# Patient Record
Sex: FEMALE | URBAN - METROPOLITAN AREA
[De-identification: names, ages, dates, MRNs, and addresses within clinical notes are randomized per-mention and may not be internally consistent; named-entity substitution may affect disease eponyms.]

---

## 2020-12-30 ENCOUNTER — INPATIENT (INPATIENT)
Facility: HOSPITAL | Age: 60
LOS: 6 days | Discharge: HOME HEALTH | DRG: 177 | End: 2021-01-06
Attending: INTERNAL MEDICINE | Admitting: INTERNAL MEDICINE
Payer: COMMERCIAL

## 2020-12-30 VITALS
WEIGHT: 186.07 LBS | SYSTOLIC BLOOD PRESSURE: 110 MMHG | OXYGEN SATURATION: 89 % | HEIGHT: 64 IN | HEART RATE: 82 BPM | TEMPERATURE: 98 F | RESPIRATION RATE: 22 BRPM | DIASTOLIC BLOOD PRESSURE: 72 MMHG

## 2020-12-30 DIAGNOSIS — J96.01 ACUTE RESPIRATORY FAILURE WITH HYPOXIA: ICD-10-CM

## 2020-12-30 LAB
ALBUMIN SERPL ELPH-MCNC: 3.9 G/DL — SIGNIFICANT CHANGE UP (ref 3.3–5)
ALP SERPL-CCNC: 64 U/L — SIGNIFICANT CHANGE UP (ref 40–120)
ALT FLD-CCNC: 27 U/L — SIGNIFICANT CHANGE UP (ref 10–45)
ANION GAP SERPL CALC-SCNC: 15 MMOL/L — SIGNIFICANT CHANGE UP (ref 5–17)
AST SERPL-CCNC: 64 U/L — HIGH (ref 10–40)
BASOPHILS # BLD AUTO: 0 K/UL — SIGNIFICANT CHANGE UP (ref 0–0.2)
BASOPHILS NFR BLD AUTO: 0 % — SIGNIFICANT CHANGE UP (ref 0–2)
BILIRUB SERPL-MCNC: 0.6 MG/DL — SIGNIFICANT CHANGE UP (ref 0.2–1.2)
BUN SERPL-MCNC: 19 MG/DL — SIGNIFICANT CHANGE UP (ref 7–23)
CALCIUM SERPL-MCNC: 8.9 MG/DL — SIGNIFICANT CHANGE UP (ref 8.4–10.5)
CHLORIDE SERPL-SCNC: 101 MMOL/L — SIGNIFICANT CHANGE UP (ref 96–108)
CO2 SERPL-SCNC: 20 MMOL/L — LOW (ref 22–31)
CREAT SERPL-MCNC: 1.09 MG/DL — SIGNIFICANT CHANGE UP (ref 0.5–1.3)
CRP SERPL-MCNC: 3.59 MG/DL — HIGH (ref 0–0.4)
D DIMER BLD IA.RAPID-MCNC: 165 NG/ML DDU — SIGNIFICANT CHANGE UP
EOSINOPHIL # BLD AUTO: 0 K/UL — SIGNIFICANT CHANGE UP (ref 0–0.5)
EOSINOPHIL NFR BLD AUTO: 0 % — SIGNIFICANT CHANGE UP (ref 0–6)
FERRITIN SERPL-MCNC: 598 NG/ML — HIGH (ref 15–150)
GLUCOSE SERPL-MCNC: 110 MG/DL — HIGH (ref 70–99)
HCT VFR BLD CALC: 42.3 % — SIGNIFICANT CHANGE UP (ref 34.5–45)
HGB BLD-MCNC: 14.2 G/DL — SIGNIFICANT CHANGE UP (ref 11.5–15.5)
LYMPHOCYTES # BLD AUTO: 0.42 K/UL — LOW (ref 1–3.3)
LYMPHOCYTES # BLD AUTO: 13.8 % — SIGNIFICANT CHANGE UP (ref 13–44)
MCHC RBC-ENTMCNC: 29.7 PG — SIGNIFICANT CHANGE UP (ref 27–34)
MCHC RBC-ENTMCNC: 33.6 GM/DL — SIGNIFICANT CHANGE UP (ref 32–36)
MCV RBC AUTO: 88.5 FL — SIGNIFICANT CHANGE UP (ref 80–100)
MONOCYTES # BLD AUTO: 0.36 K/UL — SIGNIFICANT CHANGE UP (ref 0–0.9)
MONOCYTES NFR BLD AUTO: 12.1 % — SIGNIFICANT CHANGE UP (ref 2–14)
NEUTROPHILS # BLD AUTO: 2.2 K/UL — SIGNIFICANT CHANGE UP (ref 1.8–7.4)
NEUTROPHILS NFR BLD AUTO: 64.6 % — SIGNIFICANT CHANGE UP (ref 43–77)
PLATELET # BLD AUTO: 185 K/UL — SIGNIFICANT CHANGE UP (ref 150–400)
POTASSIUM SERPL-MCNC: 4.5 MMOL/L — SIGNIFICANT CHANGE UP (ref 3.5–5.3)
POTASSIUM SERPL-SCNC: 4.5 MMOL/L — SIGNIFICANT CHANGE UP (ref 3.5–5.3)
PROCALCITONIN SERPL-MCNC: 0.17 NG/ML — HIGH (ref 0.02–0.1)
PROT SERPL-MCNC: 8.2 G/DL — SIGNIFICANT CHANGE UP (ref 6–8.3)
RBC # BLD: 4.78 M/UL — SIGNIFICANT CHANGE UP (ref 3.8–5.2)
RBC # FLD: 13.7 % — SIGNIFICANT CHANGE UP (ref 10.3–14.5)
SARS-COV-2 RNA SPEC QL NAA+PROBE: DETECTED
SODIUM SERPL-SCNC: 136 MMOL/L — SIGNIFICANT CHANGE UP (ref 135–145)
WBC # BLD: 3.01 K/UL — LOW (ref 3.8–10.5)
WBC # FLD AUTO: 3.01 K/UL — LOW (ref 3.8–10.5)

## 2020-12-30 PROCEDURE — 71045 X-RAY EXAM CHEST 1 VIEW: CPT | Mod: 26

## 2020-12-30 PROCEDURE — 99285 EMERGENCY DEPT VISIT HI MDM: CPT

## 2020-12-30 PROCEDURE — 99255 IP/OBS CONSLTJ NEW/EST HI 80: CPT

## 2020-12-30 PROCEDURE — 93010 ELECTROCARDIOGRAM REPORT: CPT | Mod: NC

## 2020-12-30 RX ORDER — REMDESIVIR 5 MG/ML
100 INJECTION INTRAVENOUS EVERY 24 HOURS
Refills: 0 | Status: COMPLETED | OUTPATIENT
Start: 2020-12-31 | End: 2021-01-03

## 2020-12-30 RX ORDER — PANTOPRAZOLE SODIUM 20 MG/1
40 TABLET, DELAYED RELEASE ORAL
Refills: 0 | Status: DISCONTINUED | OUTPATIENT
Start: 2020-12-30 | End: 2021-01-06

## 2020-12-30 RX ORDER — DEXAMETHASONE 0.5 MG/5ML
6 ELIXIR ORAL DAILY
Refills: 0 | Status: DISCONTINUED | OUTPATIENT
Start: 2020-12-30 | End: 2021-01-06

## 2020-12-30 RX ORDER — ACETAMINOPHEN 500 MG
650 TABLET ORAL EVERY 6 HOURS
Refills: 0 | Status: DISCONTINUED | OUTPATIENT
Start: 2020-12-30 | End: 2021-01-06

## 2020-12-30 RX ORDER — BUDESONIDE AND FORMOTEROL FUMARATE DIHYDRATE 160; 4.5 UG/1; UG/1
2 AEROSOL RESPIRATORY (INHALATION)
Refills: 0 | Status: DISCONTINUED | OUTPATIENT
Start: 2020-12-30 | End: 2020-12-31

## 2020-12-30 RX ORDER — ENOXAPARIN SODIUM 100 MG/ML
40 INJECTION SUBCUTANEOUS EVERY 12 HOURS
Refills: 0 | Status: DISCONTINUED | OUTPATIENT
Start: 2020-12-30 | End: 2021-01-06

## 2020-12-30 RX ORDER — ACETAMINOPHEN 500 MG
975 TABLET ORAL ONCE
Refills: 0 | Status: COMPLETED | OUTPATIENT
Start: 2020-12-30 | End: 2020-12-30

## 2020-12-30 RX ORDER — REMDESIVIR 5 MG/ML
200 INJECTION INTRAVENOUS EVERY 24 HOURS
Refills: 0 | Status: COMPLETED | OUTPATIENT
Start: 2020-12-30 | End: 2020-12-30

## 2020-12-30 RX ORDER — REMDESIVIR 5 MG/ML
INJECTION INTRAVENOUS
Refills: 0 | Status: COMPLETED | OUTPATIENT
Start: 2020-12-30 | End: 2021-01-03

## 2020-12-30 RX ORDER — ALBUTEROL 90 UG/1
2 AEROSOL, METERED ORAL ONCE
Refills: 0 | Status: COMPLETED | OUTPATIENT
Start: 2020-12-30 | End: 2020-12-30

## 2020-12-30 RX ORDER — INFLUENZA VIRUS VACCINE 15; 15; 15; 15 UG/.5ML; UG/.5ML; UG/.5ML; UG/.5ML
0.5 SUSPENSION INTRAMUSCULAR ONCE
Refills: 0 | Status: DISCONTINUED | OUTPATIENT
Start: 2020-12-30 | End: 2021-01-06

## 2020-12-30 RX ORDER — ALBUTEROL 90 UG/1
2 AEROSOL, METERED ORAL EVERY 6 HOURS
Refills: 0 | Status: DISCONTINUED | OUTPATIENT
Start: 2020-12-30 | End: 2020-12-31

## 2020-12-30 RX ORDER — DEXAMETHASONE 0.5 MG/5ML
6 ELIXIR ORAL ONCE
Refills: 0 | Status: COMPLETED | OUTPATIENT
Start: 2020-12-30 | End: 2020-12-30

## 2020-12-30 RX ORDER — ONDANSETRON 8 MG/1
4 TABLET, FILM COATED ORAL ONCE
Refills: 0 | Status: COMPLETED | OUTPATIENT
Start: 2020-12-30 | End: 2020-12-30

## 2020-12-30 RX ORDER — SODIUM CHLORIDE 9 MG/ML
1000 INJECTION, SOLUTION INTRAVENOUS ONCE
Refills: 0 | Status: COMPLETED | OUTPATIENT
Start: 2020-12-30 | End: 2020-12-30

## 2020-12-30 RX ADMIN — ALBUTEROL 2 PUFF(S): 90 AEROSOL, METERED ORAL at 12:20

## 2020-12-30 RX ADMIN — Medication 6 MILLIGRAM(S): at 21:17

## 2020-12-30 RX ADMIN — REMDESIVIR 500 MILLIGRAM(S): 5 INJECTION INTRAVENOUS at 18:25

## 2020-12-30 RX ADMIN — Medication 100 MILLIGRAM(S): at 21:16

## 2020-12-30 RX ADMIN — Medication 650 MILLIGRAM(S): at 21:25

## 2020-12-30 RX ADMIN — Medication 650 MILLIGRAM(S): at 22:25

## 2020-12-30 RX ADMIN — ENOXAPARIN SODIUM 40 MILLIGRAM(S): 100 INJECTION SUBCUTANEOUS at 18:26

## 2020-12-30 RX ADMIN — Medication 975 MILLIGRAM(S): at 12:14

## 2020-12-30 RX ADMIN — ONDANSETRON 4 MILLIGRAM(S): 8 TABLET, FILM COATED ORAL at 12:01

## 2020-12-30 RX ADMIN — Medication 6 MILLIGRAM(S): at 12:13

## 2020-12-30 RX ADMIN — Medication 975 MILLIGRAM(S): at 12:05

## 2020-12-30 RX ADMIN — SODIUM CHLORIDE 500 MILLILITER(S): 9 INJECTION, SOLUTION INTRAVENOUS at 12:02

## 2020-12-30 NOTE — ED PROVIDER NOTE - CLINICAL SUMMARY MEDICAL DECISION MAKING FREE TEXT BOX
Attending MD Khan: 60F with asthma and COPD (no home O2) presenting with COVID dx and hypoxic resp failure (O2 sat 85% in RA). Likely severe COVID-19 requiring admission to hospital for IV steroids and remdesivir therapy.       *The above represents an initial assessment/impression. Please refer to progress notes for potential changes in patient clinical course*

## 2020-12-30 NOTE — H&P ADULT - HISTORY OF PRESENT ILLNESS
60F with COPD and asthma presents with COVID dx, cough, headache, nausea, and body aches x 3-4 days.  Pt reports worsening SOB. Does not use home O2 for COPD. Also reports diffuse lower abdominal pain. Denies fevers, CP, vomiting, diarrhea.

## 2020-12-30 NOTE — ED ADULT TRIAGE NOTE - CHIEF COMPLAINT QUOTE
COVID positive diagnosed 3 days ago, weakness, shortness of breath, fevers, cough x 6 days. Hypoxic on room air

## 2020-12-30 NOTE — ED PROVIDER NOTE - ATTENDING CONTRIBUTION TO CARE
Attending MD Khan:   I personally have seen and examined this patient.  Physician assistant note reviewed and agree on plan of care and except where noted.  See HPI, PE, and MDM for details.

## 2020-12-30 NOTE — ED PROVIDER NOTE - OBJECTIVE STATEMENT
60F with COPD and asthma presents with COVID dx, cough, headache nausea x 9 -10 days      Antonio Noel 60F with COPD and asthma presents with COVID dx, cough, headache, nausea, and body aches x 9 -10 days.  Pt reports worsening SOB. Does not use home O2 for COPD. Also reports diffuse lower abdominal pain. Denies fevers, CP, vomiting, diarrhea.   PCP Antonio Noel

## 2020-12-30 NOTE — H&P ADULT - ASSESSMENT
60 f with    Probable COVID 19 Pneumonia  - remdesivir  - steroids  - Proventil inhaler  - follow inflammatory markers  - ID evaluation  - Pulmonary evaluation  - AC    COPD  - nebs  - Pulmonary evaluation    Nausea control    Further action as per clinical course     Andres Ariza MD pager [phone]

## 2020-12-30 NOTE — H&P ADULT - NSHPREVIEWOFSYSTEMS_GEN_ALL_CORE
REVIEW OF SYSTEMS:    CONSTITUTIONAL: + weakness,+ fevers + chills  EYES/ENT: No visual changes;  No vertigo or throat pain   NECK: No pain or stiffness  RESPIRATORY: + cough, no wheezing, no hemoptysis; + shortness of breath  CARDIOVASCULAR: No chest pain or palpitations  GASTROINTESTINAL: No abdominal or epigastric pain. No nausea, vomiting, or hematemesis; + diarrhea . No melena or hematochezia.  GENITOURINARY: No dysuria, frequency or hematuria  NEUROLOGICAL: No numbness or weakness  SKIN: No itching, burning, rashes, or lesions   All other review of systems is negative unless indicated above.

## 2020-12-30 NOTE — ED ADULT NURSE NOTE - OBJECTIVE STATEMENT
60 year old female pt presented to the ED co sob/fatigue, pt diagnosed with covid-19 x 3 days ago states increasingly SOB, pt with a H/O COPD not O2 dependant, pt not in respiratory distress , o2 sat to mid 80s%, pt with 6L NC O2 sat 97%, pt states dec PO, fatigue, abd soft non tender non distended lung field cta, pt states feeling better with O2,

## 2020-12-30 NOTE — CONSULT NOTE ADULT - SUBJECTIVE AND OBJECTIVE BOX
"HPI:  60F with COPD and asthma presents with COVID dx, cough, headache, nausea, and body aches x 3-4 days.  Pt reports worsening SOB. Does not use home O2 for COPD. Also reports diffuse lower abdominal pain. Denies fevers, CP, vomiting, diarrhea.  (30 Dec 2020 15:27)"    Above reviewed. Saw/spoke to patient. Patient states starting ~1 week before Windy developed mild symptoms of fatigue, headache, body aches, abd pain. These symptoms progressed into [place] to the point where she was so fatigued she couldn't get out of bed. Had a rapid test that was positive for COVID so was sent to the hospital for further care. Today, patient on NC O2, feels SOB worse on ambulation. Feels nausea. Generally feels unwell but is stable. ID called for further eval.    PAST MEDICAL & SURGICAL HISTORY:  COPD (chronic obstructive pulmonary disease)    Allergies    penicillin (Rash)    ANTIMICROBIALS:  remdesivir  IVPB      OTHER MEDS:  acetaminophen   Tablet .. 650 milliGRAM(s) Oral every 6 hours PRN  ALBUTerol    90 MICROgram(s) HFA Inhaler 2 Puff(s) Inhalation every 6 hours PRN  budesonide  80 MICROgram(s)/formoterol 4.5 MICROgram(s) Inhaler 2 Puff(s) Inhalation two times a day  dexAMETHasone  Injectable 6 milliGRAM(s) IV Push daily  enoxaparin Injectable 40 milliGRAM(s) SubCutaneous every 12 hours  pantoprazole    Tablet 40 milliGRAM(s) Oral before breakfast    SOCIAL HISTORY: No tobacco, no alcohol, no illicit drugs    FAMILY HISTORY: No pertinent family history relating to chief complaint    Drug Dosing Weight  Height (cm): 162.6 (30 Dec 2020 10:55)  Weight (kg): 84.4 (30 Dec 2020 10:55)  BMI (kg/m2): 31.9 (30 Dec 2020 10:55)  BSA (m2): 1.9 (30 Dec 2020 10:55)    PE:    Vital Signs Last 24 Hrs  T(C): 36.7 (30 Dec 2020 15:53), Max: 36.8 (30 Dec 2020 14:05)  T(F): 98.1 (30 Dec 2020 15:53), Max: 98.3 (30 Dec 2020 14:05)  HR: 66 (30 Dec 2020 15:53) (66 - 82)  BP: 106/69 (30 Dec 2020 15:53) (97/65 - 110/72)  RR: 22 (30 Dec 2020 15:53) (22 - 22)  SpO2: 96% (30 Dec 2020 15:53) (89% - 100%)    Gen: AOx3, NAD, non-toxic  CV: S1+S2 normal, nontachycardic  Resp: Clear bilat, no resp distress, no crackles/wheezes, nasal canula 6L  Abd: Soft, nontender, +BS  Ext: No LE edema, no wounds  : No Javier  IV/Skin: No thrombophlebitis  Msk: No low back pain, no arthralgias, no joint swelling  Neuro: No sensory deficits, no motor deficits    LABS:                        14.2   3.01  )-----------( 185      ( 30 Dec 2020 12:10 )             42.3     12-30    136  |  101  |  19  ----------------------------<  110<H>  4.5   |  20<L>  |  1.09    Ca    8.9      30 Dec 2020 12:10    TPro  8.2  /  Alb  3.9  /  TBili  0.6  /  DBili  x   /  AST  64<H>  /  ALT  27  /  AlkPhos  64  12-30    MICROBIOLOGY:    COVID pending (reports outpatient rapid test positive)    RADIOLOGY:    12/30 XR:    Impression:    Poor inspiratory effort. The heart is normal in size. Ill-defined opacities are seen in both lower lobe and pneumonia cannot be ruled out entirely. No pleural effusion. No pneumothorax. No acute bony pathology could be identified.

## 2020-12-30 NOTE — CONSULT NOTE ADULT - ASSESSMENT
60 F with COPD and asthma presents with COVID dx, cough, headache, nausea, and body aches x 3-4 days  Reported outpatient rapid COVID+  CXR with bilateral lower lobe ill defined opacities, I reviewed personally  Clinically appears as COVID vs alternate viral etiology  Overall,  1) COVID  - Remdisivir, 5 days then DC  - Dex 6mg po q 24 x 10 days (note that patient presently on 6L NC O2 supplementation)  - Supportive care, monitor for secondary infections, presently does not appear to have bacterial infection  2) Hypoxia  - Note baseline COPD, not normally on home O2  - O2 supplementation per primary team    Compa Alberto MD  Pager [phone]  After 5pm and on weekends call [phone]

## 2020-12-30 NOTE — H&P ADULT - NSHPLABSRESULTS_GEN_ALL_CORE
14.2   3.01  )-----------( 185      ( 30 Dec 2020 12:10 )             42.3       12-30    136  |  101  |  19  ----------------------------<  110<H>  4.5   |  20<L>  |  1.09    Ca    8.9      30 Dec 2020 12:10    TPro  8.2  /  Alb  3.9  /  TBili  0.6  /  DBili  x   /  AST  64<H>  /  ALT  27  /  AlkPhos  64  12-30          < from: Xray Chest 1 View- PORTABLE-Urgent (12.30.20 @ 12:17) >    Impression:    Poor inspiratory effort. The heart is normal in size. Ill-defined opacities are seen in both lower lobe and pneumonia cannot be ruled out entirely. No pleural effusion. No pneumothorax. No acute bony pathology could be identified.    < end of copied text >    EKG SR

## 2020-12-30 NOTE — H&P ADULT - NSHPPHYSICALEXAM_GEN_ALL_CORE
PHYSICAL EXAMINATION:  Vital Signs Last 24 Hrs  T(C): 36.8 (30 Dec 2020 14:05), Max: 36.8 (30 Dec 2020 14:05)  T(F): 98.3 (30 Dec 2020 14:05), Max: 98.3 (30 Dec 2020 14:05)  HR: 71 (30 Dec 2020 14:05) (70 - 82)  BP: 106/64 (30 Dec 2020 14:05) (97/65 - 110/72)  BP(mean): --  RR: 22 (30 Dec 2020 14:05) (22 - 22)  SpO2: 95% (30 Dec 2020 14:05) (89% - 100%)  CAPILLARY BLOOD GLUCOSE          GENERAL: NAD, well-groomed, well-developed  HEAD:  atraumatic, normocephalic  EYES: sclera anicteric  ENMT: mucous membranes moist  NECK: supple, No JVD  CHEST/LUNG: clear to auscultation bilaterally; no rales, rhonchi, or wheezing b/l  HEART: normal S1, S2  ABDOMEN: BS+, soft, ND, NT   EXTREMITIES:  pulses palpable; no clubbing, cyanosis, or edema b/l LEs  NEURO: awake, alert, interactive; moves all extremities  SKIN: no rashes or lesions

## 2020-12-31 LAB
ANION GAP SERPL CALC-SCNC: 12 MMOL/L — SIGNIFICANT CHANGE UP (ref 5–17)
BUN SERPL-MCNC: 18 MG/DL — SIGNIFICANT CHANGE UP (ref 7–23)
CALCIUM SERPL-MCNC: 9.1 MG/DL — SIGNIFICANT CHANGE UP (ref 8.4–10.5)
CHLORIDE SERPL-SCNC: 104 MMOL/L — SIGNIFICANT CHANGE UP (ref 96–108)
CO2 SERPL-SCNC: 21 MMOL/L — LOW (ref 22–31)
CREAT SERPL-MCNC: 0.79 MG/DL — SIGNIFICANT CHANGE UP (ref 0.5–1.3)
GLUCOSE SERPL-MCNC: 166 MG/DL — HIGH (ref 70–99)
HCT VFR BLD CALC: 40.9 % — SIGNIFICANT CHANGE UP (ref 34.5–45)
HCV AB S/CO SERPL IA: 0.14 S/CO — SIGNIFICANT CHANGE UP (ref 0–0.99)
HCV AB SERPL-IMP: SIGNIFICANT CHANGE UP
HGB BLD-MCNC: 13.5 G/DL — SIGNIFICANT CHANGE UP (ref 11.5–15.5)
MCHC RBC-ENTMCNC: 29.4 PG — SIGNIFICANT CHANGE UP (ref 27–34)
MCHC RBC-ENTMCNC: 33 GM/DL — SIGNIFICANT CHANGE UP (ref 32–36)
MCV RBC AUTO: 89.1 FL — SIGNIFICANT CHANGE UP (ref 80–100)
NRBC # BLD: 0 /100 WBCS — SIGNIFICANT CHANGE UP (ref 0–0)
PLATELET # BLD AUTO: 165 K/UL — SIGNIFICANT CHANGE UP (ref 150–400)
POTASSIUM SERPL-MCNC: 3.9 MMOL/L — SIGNIFICANT CHANGE UP (ref 3.5–5.3)
POTASSIUM SERPL-SCNC: 3.9 MMOL/L — SIGNIFICANT CHANGE UP (ref 3.5–5.3)
RBC # BLD: 4.59 M/UL — SIGNIFICANT CHANGE UP (ref 3.8–5.2)
RBC # FLD: 13.3 % — SIGNIFICANT CHANGE UP (ref 10.3–14.5)
SODIUM SERPL-SCNC: 137 MMOL/L — SIGNIFICANT CHANGE UP (ref 135–145)
WBC # BLD: 2.39 K/UL — LOW (ref 3.8–10.5)
WBC # FLD AUTO: 2.39 K/UL — LOW (ref 3.8–10.5)

## 2020-12-31 PROCEDURE — 99232 SBSQ HOSP IP/OBS MODERATE 35: CPT

## 2020-12-31 RX ORDER — GUAIFENESIN/DEXTROMETHORPHAN 600MG-30MG
10 TABLET, EXTENDED RELEASE 12 HR ORAL
Refills: 0 | Status: DISCONTINUED | OUTPATIENT
Start: 2020-12-31 | End: 2021-01-06

## 2020-12-31 RX ORDER — ONDANSETRON 8 MG/1
4 TABLET, FILM COATED ORAL ONCE
Refills: 0 | Status: COMPLETED | OUTPATIENT
Start: 2020-12-31 | End: 2020-12-31

## 2020-12-31 RX ORDER — BUDESONIDE AND FORMOTEROL FUMARATE DIHYDRATE 160; 4.5 UG/1; UG/1
2 AEROSOL RESPIRATORY (INHALATION)
Refills: 0 | Status: DISCONTINUED | OUTPATIENT
Start: 2020-12-31 | End: 2021-01-06

## 2020-12-31 RX ADMIN — Medication 6 MILLIGRAM(S): at 05:21

## 2020-12-31 RX ADMIN — PANTOPRAZOLE SODIUM 40 MILLIGRAM(S): 20 TABLET, DELAYED RELEASE ORAL at 05:21

## 2020-12-31 RX ADMIN — ONDANSETRON 4 MILLIGRAM(S): 8 TABLET, FILM COATED ORAL at 17:51

## 2020-12-31 RX ADMIN — Medication 100 MILLIGRAM(S): at 05:21

## 2020-12-31 RX ADMIN — REMDESIVIR 500 MILLIGRAM(S): 5 INJECTION INTRAVENOUS at 17:21

## 2020-12-31 RX ADMIN — Medication 100 MILLIGRAM(S): at 11:26

## 2020-12-31 RX ADMIN — BUDESONIDE AND FORMOTEROL FUMARATE DIHYDRATE 2 PUFF(S): 160; 4.5 AEROSOL RESPIRATORY (INHALATION) at 17:21

## 2020-12-31 RX ADMIN — ENOXAPARIN SODIUM 40 MILLIGRAM(S): 100 INJECTION SUBCUTANEOUS at 17:21

## 2020-12-31 RX ADMIN — Medication 200 MILLIGRAM(S): at 13:50

## 2020-12-31 RX ADMIN — BUDESONIDE AND FORMOTEROL FUMARATE DIHYDRATE 2 PUFF(S): 160; 4.5 AEROSOL RESPIRATORY (INHALATION) at 05:21

## 2020-12-31 RX ADMIN — ENOXAPARIN SODIUM 40 MILLIGRAM(S): 100 INJECTION SUBCUTANEOUS at 05:21

## 2020-12-31 RX ADMIN — Medication 200 MILLIGRAM(S): at 21:37

## 2020-12-31 NOTE — PROGRESS NOTE ADULT - ASSESSMENT
60 f with    Probable COVID 19 Pneumonia  - remdesivir 2/5  - steroids 2/10  - Proventil inhaler  - follow inflammatory markers  - ID follow  - Pulmonary follow   - AC    COPD  - nebs  - Pulmonary evaluation    Nausea control    Andres Ariza MD pager [phone]

## 2020-12-31 NOTE — CONSULT NOTE ADULT - SUBJECTIVE AND OBJECTIVE BOX
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 The patient has been feeling unwell since before Christmas with fatigue and malaise, myalgias, cough, headache and nausea with anorexia. She subsequently developed abdominal discomfort with nausea, vomiting and diarrhea. On Christmas, she was so tired she was unable to get out of bed. She tested positive for COVID-19 infection on 12/27. Since that time, she has developed a non productive cough associated with shortness of breath with exertion, chest congestion and wheeze. No fevers, chills or sweats. No chest pain/pressure or palpitations    PMHX:  COPD (chronic obstructive pulmonary disease)    Asthma    PSHX:  no significant past medical history      FAMILY HISTORY:  2 young children at home have COVID-19 infection      SOCIAL HISTORY:  remote smoker    Pulmonary Medications:   benzonatate 100 milliGRAM(s) Oral every 8 hours  budesonide  80 MICROgram(s)/formoterol 4.5 MICROgram(s) Inhaler 2 Puff(s) Inhalation two times a day      Antimicrobials:  remdesivir  IVPB 100 milliGRAM(s) IV Intermittent every 24 hours  remdesivir  IVPB   IV Intermittent       Cardiology:      Other:  dexAMETHasone  Injectable 6 milliGRAM(s) IV Push daily  enoxaparin Injectable 40 milliGRAM(s) SubCutaneous every 12 hours  influenza   Vaccine 0.5 milliLiter(s) IntraMuscular once  pantoprazole    Tablet 40 milliGRAM(s) Oral before breakfast      Prn:  MEDICATIONS  (PRN):  acetaminophen   Tablet .. 650 milliGRAM(s) Oral every 6 hours PRN Temp greater or equal to 38.5C (101.3F), Mild Pain (1 - 3)  ALBUTerol    90 MICROgram(s) HFA Inhaler 2 Puff(s) Inhalation every 6 hours PRN Shortness of Breath and/or Wheezing  guaiFENesin   Syrup  (Sugar-Free) 100 milliGRAM(s) Oral every 6 hours PRN Cough      Allergies    penicillin (Rash)    HOME MEDICATIONS: see  H & P    REVIEW OF SYSTEMS:  Constitutional: As per HPI  HEENT: Within normal limits  CV: As per HPI  Resp: As per HPI  GI: Within normal limits   : Within normal limits  Musculoskeletal: Within normal limits  Skin: Within normal limits  Neurological: Within normal limits  Psychiatric: Within normal limits  Endocrine: Within normal limits  Hematologic/Lymphatic: Within normal limits  Allergic/Immunologic: Within normal limits    [x] All other systems negative    OBJECTIVE:    I&O's Detail    30 Dec 2020 07:01  -  31 Dec 2020 07:00  --------------------------------------------------------  IN:    Oral Fluid: 520 mL  Total IN: 520 mL    OUT:  Total OUT: 0 mL    Total NET: 520 mL      31 Dec 2020 07:01  -  31 Dec 2020 12:42  --------------------------------------------------------  IN:    Oral Fluid: 480 mL  Total IN: 480 mL    OUT:  Total OUT: 0 mL    Total NET: 480 mL    PHYSICAL EXAM:  ICU Vital Signs Last 24 Hrs  T(C): 36.4 (31 Dec 2020 10:15), Max: 36.8 (30 Dec 2020 14:05)  T(F): 97.5 (31 Dec 2020 10:15), Max: 98.3 (30 Dec 2020 14:05)  HR: 66 (31 Dec 2020 10:15) (57 - 74)  BP: 115/79 (31 Dec 2020 10:15) (104/71 - 115/79)  BP(mean): --  ABP: --  ABP(mean): --  RR: 20 (31 Dec 2020 10:15) (18 - 22)  SpO2: 93% (31 Dec 2020 10:15) (93% - 96%) on 5lpm nasal canula    General: Awake. Alert. Cooperative. No distress. Appears stated age. Obese. 	  HEENT:   Atraumatic. Normocephalic. Anicteric. Normal oral mucosa. PERRL. EOMI.  Neck: Supple. Trachea midline. Thyroid without enlargement/tenderness/nodules. No carotid bruit. No JVD.	  Cardiovascular: Regular rate and rhythm. S1 S2 normal. No murmurs, rubs or gallops.  Respiratory: Respirations unlabored. Clear to auscultation and percussion bilaterally. No curvature.  Abdomen: Soft. Non-tender. Non-distended. No organomegaly. No masses. Normal bowel sounds.  Extremities: Warm to touch. No clubbing or cyanosis. No pedal edema.  Pulses: 2+ peripheral pulses all extremities.	  Skin: Normal skin color. No rashes or lesions. No ecchymoses. No cyanosis. Warm to touch.  Lymph Nodes: Cervical, supraclavicular and axillary nodes normal  Neurological: Motor and sensory examination equal and normal. A and O x 3  Psychiatry: Appropriate mood and affect.      LABS:                          13.5   2.39  )-----------( 165      ( 31 Dec 2020 06:40 )             40.9     CBC    WBC  2.39 <==, 3.01 <==    Hemoglobin  13.5 <<==, 14.2 <<==    Hematocrit  40.9 <==, 42.3 <==    Platelets  165 <==, 185 <==      137  |  104  |  18  ----------------------------<  166<H>    12-31  3.9   |  21<L>  |  0.79    LYTES    sodium  137 <==, 136 <==    potassium   3.9 <==, 4.5 <==    chloride  104 <==, 101 <==    carbon dioxide  21 <==, 20 <==    =============================================================================================  RENAL FUNCTION:    Creatinine:   0.79  <<==, 1.09  <<==    BUN:   18 <==, 19 <==    ============================================================================================    calcium   9.1 <==, 8.9 <==    ============================================================================================  LFTs    AST:   64 <==     ALT:  27  <==     AP:  64  <=    Bili:  0.6  <=    Procalcitonin, Serum: 0.17 ng/mL (12-30 @ 12:10)    Ferritin, Serum: 598 ng/mL (12.30.20 @ 17:35)   D-Dimer Assay, Quantitative: 165 ng/mL DDU (12-30 @ 12:10)  C-Reactive Protein, Serum: 3.59 mg/dL (12.30.20 @ 12:10)     MICROBIOLOGY:     COVID-19 PCR . (12.30.20 @ 12:10)   COVID-19 PCR: Detected: You can help in the fight against COVID-19. [facility] may contact   you to see if you are interested in voluntarily participating in one of   our clinical trials.   This test has been validated by [employer] to be accurate;   though it has not been FDA cleared/approved by the usual pathway.   As with all laboratory tests, results should be correlated with clinical   findings.   https://www.fda.gov/media/365459/download   https://www.fda.gov/media/710160/download       RADIOLOGY:  [x ] Chest radiographs reviewed and interpreted by me  < from: Xray Chest 1 View- PORTABLE-Urgent (12.30.20 @ 12:17) >    EXAM:  XR CHEST PORTABLE URGENT 1V                          PROCEDURE DATE:  12/30/2020      INTERPRETATION:  A single chest x-ray was obtained on December 30, 2020.    Indication: Cough. Shortness of breath. Fever.    Impression:    Poor inspiratory effort. The heart is normal in size. Ill-defined opacities are seen in both lower lobe and pneumonia cannot be ruled out entirely. No pleural effusion. No pneumothorax. No acute bony pathology could be identified.    RAYA SONI MD; Attending Radiologist  This document has been electronically signed. 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 The patient has been feeling unwell since before Christmas with fatigue and malaise, myalgias, cough, headache and nausea with anorexia. She subsequently developed abdominal discomfort with nausea, vomiting and diarrhea. On Christmas, she was so tired she was unable to get out of bed. She tested positive for COVID-19 infection on 12/27. Since that time, she has developed a non productive cough associated with shortness of breath with exertion, chest congestion and wheeze. No fevers, chills or sweats. No chest pain/pressure or palpitations    PMHX:  COPD (chronic obstructive pulmonary disease)    Asthma    PSHX:  no significant past medical history      FAMILY HISTORY:  2 young children at home have COVID-19 infection      SOCIAL HISTORY:  remote smoker    Pulmonary Medications:   benzonatate 100 milliGRAM(s) Oral every 8 hours  budesonide  80 MICROgram(s)/formoterol 4.5 MICROgram(s) Inhaler 2 Puff(s) Inhalation two times a day      Antimicrobials:  remdesivir  IVPB 100 milliGRAM(s) IV Intermittent every 24 hours  remdesivir  IVPB   IV Intermittent       Cardiology:      Other:  dexAMETHasone  Injectable 6 milliGRAM(s) IV Push daily  enoxaparin Injectable 40 milliGRAM(s) SubCutaneous every 12 hours  influenza   Vaccine 0.5 milliLiter(s) IntraMuscular once  pantoprazole    Tablet 40 milliGRAM(s) Oral before breakfast      Prn:  MEDICATIONS  (PRN):  acetaminophen   Tablet .. 650 milliGRAM(s) Oral every 6 hours PRN Temp greater or equal to 38.5C (101.3F), Mild Pain (1 - 3)  ALBUTerol    90 MICROgram(s) HFA Inhaler 2 Puff(s) Inhalation every 6 hours PRN Shortness of Breath and/or Wheezing  guaiFENesin   Syrup  (Sugar-Free) 100 milliGRAM(s) Oral every 6 hours PRN Cough      Allergies    penicillin (Rash)    HOME MEDICATIONS: see  H & P    REVIEW OF SYSTEMS:  Constitutional: As per HPI  HEENT: Within normal limits  CV: As per HPI  Resp: As per HPI  GI: Within normal limits   : Within normal limits  Musculoskeletal: Within normal limits  Skin: Within normal limits  Neurological: Within normal limits  Psychiatric: Within normal limits  Endocrine: Within normal limits  Hematologic/Lymphatic: Within normal limits  Allergic/Immunologic: Within normal limits    [x] All other systems negative    OBJECTIVE:    I&O's Detail    30 Dec 2020 07:01  -  31 Dec 2020 07:00  --------------------------------------------------------  IN:    Oral Fluid: 520 mL  Total IN: 520 mL    OUT:  Total OUT: 0 mL    Total NET: 520 mL      31 Dec 2020 07:01  -  31 Dec 2020 12:42  --------------------------------------------------------  IN:    Oral Fluid: 480 mL  Total IN: 480 mL    OUT:  Total OUT: 0 mL    Total NET: 480 mL    PHYSICAL EXAM:  ICU Vital Signs Last 24 Hrs  T(C): 36.4 (31 Dec 2020 10:15), Max: 36.8 (30 Dec 2020 14:05)  T(F): 97.5 (31 Dec 2020 10:15), Max: 98.3 (30 Dec 2020 14:05)  HR: 66 (31 Dec 2020 10:15) (57 - 74)  BP: 115/79 (31 Dec 2020 10:15) (104/71 - 115/79)  BP(mean): --  ABP: --  ABP(mean): --  RR: 20 (31 Dec 2020 10:15) (18 - 22)  SpO2: 93% (31 Dec 2020 10:15) (93% - 96%) on 5lpm nasal canula    General: Awake. Alert. Cooperative. No distress. Appears stated age. Obese. 	  HEENT:   Atraumatic. Normocephalic. Anicteric. Normal oral mucosa. PERRL. EOMI.  Neck: Supple. Trachea midline. Thyroid without enlargement/tenderness/nodules. No carotid bruit. No JVD.	  Cardiovascular: Regular rate and rhythm. S1 S2 normal. No murmurs, rubs or gallops.  Respiratory: Respirations unlabored. Bilateral wheeze. No curvature.  Abdomen: Soft. Non-tender. Non-distended. No organomegaly. No masses. Normal bowel sounds.  Extremities: Warm to touch. No clubbing or cyanosis. No pedal edema.  Pulses: 2+ peripheral pulses all extremities.	  Skin: Normal skin color. No rashes or lesions. No ecchymoses. No cyanosis. Warm to touch.  Lymph Nodes: Cervical, supraclavicular and axillary nodes normal  Neurological: Motor and sensory examination equal and normal. A and O x 3  Psychiatry: Appropriate mood and affect.      LABS:                          13.5   2.39  )-----------( 165      ( 31 Dec 2020 06:40 )             40.9     CBC    WBC  2.39 <==, 3.01 <==    Hemoglobin  13.5 <<==, 14.2 <<==    Hematocrit  40.9 <==, 42.3 <==    Platelets  165 <==, 185 <==      137  |  104  |  18  ----------------------------<  166<H>    12-31  3.9   |  21<L>  |  0.79    LYTES    sodium  137 <==, 136 <==    potassium   3.9 <==, 4.5 <==    chloride  104 <==, 101 <==    carbon dioxide  21 <==, 20 <==    =============================================================================================  RENAL FUNCTION:    Creatinine:   0.79  <<==, 1.09  <<==    BUN:   18 <==, 19 <==    ============================================================================================    calcium   9.1 <==, 8.9 <==    ============================================================================================  LFTs    AST:   64 <==     ALT:  27  <==     AP:  64  <=    Bili:  0.6  <=    Procalcitonin, Serum: 0.17 ng/mL (12-30 @ 12:10)    Ferritin, Serum: 598 ng/mL (12.30.20 @ 17:35)   D-Dimer Assay, Quantitative: 165 ng/mL DDU (12-30 @ 12:10)  C-Reactive Protein, Serum: 3.59 mg/dL (12.30.20 @ 12:10)     MICROBIOLOGY:     COVID-19 PCR . (12.30.20 @ 12:10)   COVID-19 PCR: Detected: You can help in the fight against COVID-19. [employer] may contact   you to see if you are interested in voluntarily participating in one of   our clinical trials.   This test has been validated by [employer] to be accurate;   though it has not been FDA cleared/approved by the usual pathway.   As with all laboratory tests, results should be correlated with clinical   findings.   https://www.fda.gov/media/609340/download   https://www.fda.gov/media/376046/download       RADIOLOGY:  [x ] Chest radiographs reviewed and interpreted by me  < from: Xray Chest 1 View- PORTABLE-Urgent (12.30.20 @ 12:17) >    EXAM:  XR CHEST PORTABLE URGENT 1V                          PROCEDURE DATE:  12/30/2020      INTERPRETATION:  A single chest x-ray was obtained on December 30, 2020.    Indication: Cough. Shortness of breath. Fever.    Impression:    Poor inspiratory effort. The heart is normal in size. Ill-defined opacities are seen in both lower lobe and pneumonia cannot be ruled out entirely. No pleural effusion. No pneumothorax. No acute bony pathology could be identified.    RAYA SONI MD; Attending Radiologist  This document has been electronically signed. Dec 30 2020  1:28PM    < end of copied text >  ---------------------------------------------------------------------------------------------------------------

## 2020-12-31 NOTE — PROGRESS NOTE ADULT - ASSESSMENT
60 F with COPD and asthma presents with COVID dx, cough, headache, nausea, and body aches x 3-4 days  Reported outpatient rapid COVID+  CXR with bilateral lower lobe ill defined opacities, I reviewed personally  +COVID PCR  Presently 6LNC  Overall,  1) COVID  COVID+  - Remdisivir, 5 days then discontinue  - Dex 6mg po q 24 x 10 days then discontinue  - Supportive care, monitor for secondary infections, presently does not appear to have bacterial infection  2) Hypoxia  - Note baseline COPD, not normally on home O2  - O2 supplementation per primary team    Compa Alberto MD  Pager [phone]  After 5pm and on weekends call [phone]

## 2020-12-31 NOTE — CONSULT NOTE ADULT - ASSESSMENT
ASSESSMENT:    COVID-19 related pneumonia with asthma exacerbation and hypoxemia     PLAN/RECOMMENDATIONS:    oxygen supplementation to keep saturation greater than 92%  remdesivir 200mg x 1 followed by 100mg IV x 4 days - follow renal function and LFTs  decadron 6mg IV/po x 10 days  diligent DVT prophylaxis - lovenox 40mg SQ 2 times daily  robitussin DM/tessalon/hycodan  follow inflammatory markers - ferritin - CRP - d-dimer  observe off antibacterial antibiotics - procalcitonin level is not elevated    Thank you for the courtesy of this referral. Plan of care discussed with the patient at bedside     Olivier Camp MD, [facility]  [phone]  Pulmonary Medicine   ASSESSMENT:    COVID-19 related pneumonia with asthma exacerbation and hypoxemia     PLAN/RECOMMENDATIONS:    oxygen supplementation to keep saturation greater than 92%  symbicort 160/4.5 mcg 2 puffs times daily  remdesivir 200mg x 1 followed by 100mg IV x 4 days - follow renal function and LFTs  decadron 6mg IV/po x 10 days  diligent DVT prophylaxis - lovenox 40mg SQ 2 times daily  robitussin DM/tessalon/hycodan - watch for sedation, constipation, etc  follow inflammatory markers - ferritin - CRP - d-dimer  observe off antibacterial antibiotics - procalcitonin level is not elevated    Thank you for the courtesy of this referral. Plan of care discussed with the patient at bedside     Olivier Camp MD, [facility]  [phone]  Pulmonary Medicine

## 2021-01-01 LAB
ALBUMIN SERPL ELPH-MCNC: 3.3 G/DL — SIGNIFICANT CHANGE UP (ref 3.3–5)
ALP SERPL-CCNC: 55 U/L — SIGNIFICANT CHANGE UP (ref 40–120)
ALT FLD-CCNC: 21 U/L — SIGNIFICANT CHANGE UP (ref 10–45)
AST SERPL-CCNC: 38 U/L — SIGNIFICANT CHANGE UP (ref 10–40)
BILIRUB DIRECT SERPL-MCNC: <0.1 MG/DL — SIGNIFICANT CHANGE UP (ref 0–0.2)
BILIRUB INDIRECT FLD-MCNC: >0.2 MG/DL — SIGNIFICANT CHANGE UP (ref 0.2–1)
BILIRUB SERPL-MCNC: 0.3 MG/DL — SIGNIFICANT CHANGE UP (ref 0.2–1.2)
CREAT SERPL-MCNC: 0.74 MG/DL — SIGNIFICANT CHANGE UP (ref 0.5–1.3)
PROT SERPL-MCNC: 6.8 G/DL — SIGNIFICANT CHANGE UP (ref 6–8.3)
SARS-COV-2 IGG SERPL QL IA: NEGATIVE — SIGNIFICANT CHANGE UP
SARS-COV-2 IGM SERPL IA-ACNC: <0.1 INDEX — SIGNIFICANT CHANGE UP

## 2021-01-01 RX ORDER — ONDANSETRON 8 MG/1
4 TABLET, FILM COATED ORAL ONCE
Refills: 0 | Status: COMPLETED | OUTPATIENT
Start: 2021-01-01 | End: 2021-01-01

## 2021-01-01 RX ADMIN — BUDESONIDE AND FORMOTEROL FUMARATE DIHYDRATE 2 PUFF(S): 160; 4.5 AEROSOL RESPIRATORY (INHALATION) at 05:02

## 2021-01-01 RX ADMIN — Medication 10 MILLILITER(S): at 05:04

## 2021-01-01 RX ADMIN — REMDESIVIR 500 MILLIGRAM(S): 5 INJECTION INTRAVENOUS at 17:35

## 2021-01-01 RX ADMIN — PANTOPRAZOLE SODIUM 40 MILLIGRAM(S): 20 TABLET, DELAYED RELEASE ORAL at 05:03

## 2021-01-01 RX ADMIN — Medication 10 MILLILITER(S): at 11:49

## 2021-01-01 RX ADMIN — ONDANSETRON 4 MILLIGRAM(S): 8 TABLET, FILM COATED ORAL at 01:31

## 2021-01-01 RX ADMIN — BUDESONIDE AND FORMOTEROL FUMARATE DIHYDRATE 2 PUFF(S): 160; 4.5 AEROSOL RESPIRATORY (INHALATION) at 17:31

## 2021-01-01 RX ADMIN — ENOXAPARIN SODIUM 40 MILLIGRAM(S): 100 INJECTION SUBCUTANEOUS at 05:01

## 2021-01-01 RX ADMIN — Medication 6 MILLIGRAM(S): at 05:03

## 2021-01-01 RX ADMIN — Medication 200 MILLIGRAM(S): at 05:02

## 2021-01-01 RX ADMIN — Medication 200 MILLIGRAM(S): at 21:29

## 2021-01-01 RX ADMIN — ENOXAPARIN SODIUM 40 MILLIGRAM(S): 100 INJECTION SUBCUTANEOUS at 17:31

## 2021-01-01 RX ADMIN — Medication 10 MILLILITER(S): at 17:32

## 2021-01-01 NOTE — PROGRESS NOTE ADULT - ASSESSMENT
ASSESSMENT:    COVID-19 related pneumonia with asthma exacerbation and hypoxemia     PLAN/RECOMMENDATIONS:    oxygen supplementation to keep saturation greater than 92% - taper as tolerated  symbicort 160/4.5 mcg 2 puffs 2 times daily  remdesivir 200mg x 1 followed by 100mg IV x 4 days - follow renal function and LFTs  decadron 6mg IV/po x 10 days  diligent DVT prophylaxis - lovenox 40mg SQ 2 times daily  robitussin DM/tessalon - discontinue hycodan (sedations)  follow inflammatory markers - ferritin - CRP - d-dimer  observe off antibacterial antibiotics - procalcitonin level is not elevated    Will follow with you. Plan of care discussed with the patient at bedside    Olivier Camp MD, [facility]  [phone]  Pulmonary Medicine

## 2021-01-01 NOTE — PROGRESS NOTE ADULT - ASSESSMENT
60 f with    COVID 19 Pneumonia  - remdesivir 2/5  - steroids 2/10  - Proventil inhaler  - follow inflammatory markers  - ID follow  - Pulmonary follow   - AC    COPD  - nebs  - Pulmonary evaluation    Nausea control    Andres Ariza MD pager [phone]

## 2021-01-02 LAB
ALBUMIN SERPL ELPH-MCNC: 3.1 G/DL — LOW (ref 3.3–5)
ALP SERPL-CCNC: 58 U/L — SIGNIFICANT CHANGE UP (ref 40–120)
ALT FLD-CCNC: 22 U/L — SIGNIFICANT CHANGE UP (ref 10–45)
AST SERPL-CCNC: 41 U/L — HIGH (ref 10–40)
BILIRUB DIRECT SERPL-MCNC: <0.1 MG/DL — SIGNIFICANT CHANGE UP (ref 0–0.2)
BILIRUB INDIRECT FLD-MCNC: >0.3 MG/DL — SIGNIFICANT CHANGE UP (ref 0.2–1)
BILIRUB SERPL-MCNC: 0.4 MG/DL — SIGNIFICANT CHANGE UP (ref 0.2–1.2)
CREAT SERPL-MCNC: 0.66 MG/DL — SIGNIFICANT CHANGE UP (ref 0.5–1.3)
CRP SERPL-MCNC: 0.86 MG/DL — HIGH (ref 0–0.4)
D DIMER BLD IA.RAPID-MCNC: <150 NG/ML DDU — SIGNIFICANT CHANGE UP
FERRITIN SERPL-MCNC: 1268 NG/ML — HIGH (ref 15–150)
PROT SERPL-MCNC: 6.5 G/DL — SIGNIFICANT CHANGE UP (ref 6–8.3)

## 2021-01-02 RX ADMIN — Medication 6 MILLIGRAM(S): at 05:33

## 2021-01-02 RX ADMIN — Medication 10 MILLILITER(S): at 11:41

## 2021-01-02 RX ADMIN — REMDESIVIR 500 MILLIGRAM(S): 5 INJECTION INTRAVENOUS at 17:35

## 2021-01-02 RX ADMIN — BUDESONIDE AND FORMOTEROL FUMARATE DIHYDRATE 2 PUFF(S): 160; 4.5 AEROSOL RESPIRATORY (INHALATION) at 05:36

## 2021-01-02 RX ADMIN — Medication 10 MILLILITER(S): at 17:36

## 2021-01-02 RX ADMIN — Medication 200 MILLIGRAM(S): at 21:30

## 2021-01-02 RX ADMIN — ENOXAPARIN SODIUM 40 MILLIGRAM(S): 100 INJECTION SUBCUTANEOUS at 17:36

## 2021-01-02 RX ADMIN — Medication 200 MILLIGRAM(S): at 05:33

## 2021-01-02 RX ADMIN — Medication 10 MILLILITER(S): at 00:27

## 2021-01-02 RX ADMIN — PANTOPRAZOLE SODIUM 40 MILLIGRAM(S): 20 TABLET, DELAYED RELEASE ORAL at 05:33

## 2021-01-02 RX ADMIN — Medication 10 MILLILITER(S): at 05:34

## 2021-01-02 RX ADMIN — Medication 200 MILLIGRAM(S): at 14:25

## 2021-01-02 RX ADMIN — ENOXAPARIN SODIUM 40 MILLIGRAM(S): 100 INJECTION SUBCUTANEOUS at 05:33

## 2021-01-02 RX ADMIN — BUDESONIDE AND FORMOTEROL FUMARATE DIHYDRATE 2 PUFF(S): 160; 4.5 AEROSOL RESPIRATORY (INHALATION) at 18:00

## 2021-01-02 NOTE — PROGRESS NOTE ADULT - ASSESSMENT
60 f with    COVID 19 Pneumonia  - remdesivir 3/5  - steroids 3/10  - Proventil inhaler  - follow inflammatory markers  - ID follow  - Pulmonary follow   - AC    COPD  - nebs  - Pulmonary evaluation    Nausea control    Andres Ariza MD pager [phone]

## 2021-01-03 LAB
ALBUMIN SERPL ELPH-MCNC: 3.1 G/DL — LOW (ref 3.3–5)
ALP SERPL-CCNC: 57 U/L — SIGNIFICANT CHANGE UP (ref 40–120)
ALT FLD-CCNC: 30 U/L — SIGNIFICANT CHANGE UP (ref 10–45)
ANION GAP SERPL CALC-SCNC: 11 MMOL/L — SIGNIFICANT CHANGE UP (ref 5–17)
AST SERPL-CCNC: 43 U/L — HIGH (ref 10–40)
BILIRUB DIRECT SERPL-MCNC: 0.1 MG/DL — SIGNIFICANT CHANGE UP (ref 0–0.2)
BILIRUB SERPL-MCNC: 0.4 MG/DL — SIGNIFICANT CHANGE UP (ref 0.2–1.2)
BUN SERPL-MCNC: 13 MG/DL — SIGNIFICANT CHANGE UP (ref 7–23)
CALCIUM SERPL-MCNC: 8.7 MG/DL — SIGNIFICANT CHANGE UP (ref 8.4–10.5)
CHLORIDE SERPL-SCNC: 105 MMOL/L — SIGNIFICANT CHANGE UP (ref 96–108)
CO2 SERPL-SCNC: 25 MMOL/L — SIGNIFICANT CHANGE UP (ref 22–31)
CREAT SERPL-MCNC: 0.62 MG/DL — SIGNIFICANT CHANGE UP (ref 0.5–1.3)
GLUCOSE SERPL-MCNC: 125 MG/DL — HIGH (ref 70–99)
HCT VFR BLD CALC: 37 % — SIGNIFICANT CHANGE UP (ref 34.5–45)
HGB BLD-MCNC: 12.4 G/DL — SIGNIFICANT CHANGE UP (ref 11.5–15.5)
MCHC RBC-ENTMCNC: 29.6 PG — SIGNIFICANT CHANGE UP (ref 27–34)
MCHC RBC-ENTMCNC: 33.5 GM/DL — SIGNIFICANT CHANGE UP (ref 32–36)
MCV RBC AUTO: 88.3 FL — SIGNIFICANT CHANGE UP (ref 80–100)
NRBC # BLD: 0 /100 WBCS — SIGNIFICANT CHANGE UP (ref 0–0)
PLATELET # BLD AUTO: 227 K/UL — SIGNIFICANT CHANGE UP (ref 150–400)
POTASSIUM SERPL-MCNC: 3.8 MMOL/L — SIGNIFICANT CHANGE UP (ref 3.5–5.3)
POTASSIUM SERPL-SCNC: 3.8 MMOL/L — SIGNIFICANT CHANGE UP (ref 3.5–5.3)
PROT SERPL-MCNC: 6.8 G/DL — SIGNIFICANT CHANGE UP (ref 6–8.3)
RBC # BLD: 4.19 M/UL — SIGNIFICANT CHANGE UP (ref 3.8–5.2)
RBC # FLD: 13.2 % — SIGNIFICANT CHANGE UP (ref 10.3–14.5)
SODIUM SERPL-SCNC: 141 MMOL/L — SIGNIFICANT CHANGE UP (ref 135–145)
WBC # BLD: 4.44 K/UL — SIGNIFICANT CHANGE UP (ref 3.8–10.5)
WBC # FLD AUTO: 4.44 K/UL — SIGNIFICANT CHANGE UP (ref 3.8–10.5)

## 2021-01-03 RX ORDER — DIPHENHYDRAMINE HCL 50 MG
25 CAPSULE ORAL EVERY 6 HOURS
Refills: 0 | Status: DISCONTINUED | OUTPATIENT
Start: 2021-01-03 | End: 2021-01-06

## 2021-01-03 RX ORDER — ONDANSETRON 8 MG/1
4 TABLET, FILM COATED ORAL ONCE
Refills: 0 | Status: COMPLETED | OUTPATIENT
Start: 2021-01-03 | End: 2021-01-03

## 2021-01-03 RX ADMIN — Medication 25 MILLIGRAM(S): at 13:24

## 2021-01-03 RX ADMIN — BUDESONIDE AND FORMOTEROL FUMARATE DIHYDRATE 2 PUFF(S): 160; 4.5 AEROSOL RESPIRATORY (INHALATION) at 05:17

## 2021-01-03 RX ADMIN — Medication 10 MILLILITER(S): at 17:06

## 2021-01-03 RX ADMIN — Medication 10 MILLILITER(S): at 05:17

## 2021-01-03 RX ADMIN — PANTOPRAZOLE SODIUM 40 MILLIGRAM(S): 20 TABLET, DELAYED RELEASE ORAL at 05:17

## 2021-01-03 RX ADMIN — Medication 10 MILLILITER(S): at 11:03

## 2021-01-03 RX ADMIN — Medication 200 MILLIGRAM(S): at 21:28

## 2021-01-03 RX ADMIN — ENOXAPARIN SODIUM 40 MILLIGRAM(S): 100 INJECTION SUBCUTANEOUS at 17:06

## 2021-01-03 RX ADMIN — Medication 200 MILLIGRAM(S): at 13:22

## 2021-01-03 RX ADMIN — Medication 6 MILLIGRAM(S): at 05:14

## 2021-01-03 RX ADMIN — REMDESIVIR 500 MILLIGRAM(S): 5 INJECTION INTRAVENOUS at 17:47

## 2021-01-03 RX ADMIN — Medication 200 MILLIGRAM(S): at 05:17

## 2021-01-03 RX ADMIN — ONDANSETRON 4 MILLIGRAM(S): 8 TABLET, FILM COATED ORAL at 05:13

## 2021-01-03 RX ADMIN — ENOXAPARIN SODIUM 40 MILLIGRAM(S): 100 INJECTION SUBCUTANEOUS at 05:14

## 2021-01-03 RX ADMIN — BUDESONIDE AND FORMOTEROL FUMARATE DIHYDRATE 2 PUFF(S): 160; 4.5 AEROSOL RESPIRATORY (INHALATION) at 17:08

## 2021-01-03 NOTE — PROGRESS NOTE ADULT - ASSESSMENT
60 f with    COVID 19 Pneumonia  - remdesivir 5/5  - steroids 5/10  - Proventil inhaler  - follow inflammatory markers  - ID follow  - Pulmonary follow   - AC    COPD  - nebs  - Pulmonary evaluation    Nausea control    DCP home if stable.    Andres Ariza MD pager [phone]

## 2021-01-04 LAB
ALBUMIN SERPL ELPH-MCNC: 3.1 G/DL — LOW (ref 3.3–5)
ALP SERPL-CCNC: 56 U/L — SIGNIFICANT CHANGE UP (ref 40–120)
ALT FLD-CCNC: 38 U/L — SIGNIFICANT CHANGE UP (ref 10–45)
ANION GAP SERPL CALC-SCNC: 11 MMOL/L — SIGNIFICANT CHANGE UP (ref 5–17)
AST SERPL-CCNC: 42 U/L — HIGH (ref 10–40)
BILIRUB DIRECT SERPL-MCNC: <0.1 MG/DL — SIGNIFICANT CHANGE UP (ref 0–0.2)
BILIRUB SERPL-MCNC: 0.3 MG/DL — SIGNIFICANT CHANGE UP (ref 0.2–1.2)
BUN SERPL-MCNC: 14 MG/DL — SIGNIFICANT CHANGE UP (ref 7–23)
CALCIUM SERPL-MCNC: 8.6 MG/DL — SIGNIFICANT CHANGE UP (ref 8.4–10.5)
CHLORIDE SERPL-SCNC: 103 MMOL/L — SIGNIFICANT CHANGE UP (ref 96–108)
CO2 SERPL-SCNC: 25 MMOL/L — SIGNIFICANT CHANGE UP (ref 22–31)
CREAT SERPL-MCNC: 0.61 MG/DL — SIGNIFICANT CHANGE UP (ref 0.5–1.3)
GLUCOSE SERPL-MCNC: 142 MG/DL — HIGH (ref 70–99)
HCT VFR BLD CALC: 37.2 % — SIGNIFICANT CHANGE UP (ref 34.5–45)
HGB BLD-MCNC: 12 G/DL — SIGNIFICANT CHANGE UP (ref 11.5–15.5)
MCHC RBC-ENTMCNC: 28.8 PG — SIGNIFICANT CHANGE UP (ref 27–34)
MCHC RBC-ENTMCNC: 32.3 GM/DL — SIGNIFICANT CHANGE UP (ref 32–36)
MCV RBC AUTO: 89.4 FL — SIGNIFICANT CHANGE UP (ref 80–100)
NRBC # BLD: 0 /100 WBCS — SIGNIFICANT CHANGE UP (ref 0–0)
PLATELET # BLD AUTO: 266 K/UL — SIGNIFICANT CHANGE UP (ref 150–400)
POTASSIUM SERPL-MCNC: 4 MMOL/L — SIGNIFICANT CHANGE UP (ref 3.5–5.3)
POTASSIUM SERPL-SCNC: 4 MMOL/L — SIGNIFICANT CHANGE UP (ref 3.5–5.3)
PROT SERPL-MCNC: 6.6 G/DL — SIGNIFICANT CHANGE UP (ref 6–8.3)
RBC # BLD: 4.16 M/UL — SIGNIFICANT CHANGE UP (ref 3.8–5.2)
RBC # FLD: 13.3 % — SIGNIFICANT CHANGE UP (ref 10.3–14.5)
SODIUM SERPL-SCNC: 139 MMOL/L — SIGNIFICANT CHANGE UP (ref 135–145)
WBC # BLD: 5.97 K/UL — SIGNIFICANT CHANGE UP (ref 3.8–10.5)
WBC # FLD AUTO: 5.97 K/UL — SIGNIFICANT CHANGE UP (ref 3.8–10.5)

## 2021-01-04 PROCEDURE — 99232 SBSQ HOSP IP/OBS MODERATE 35: CPT

## 2021-01-04 RX ADMIN — Medication 10 MILLILITER(S): at 17:50

## 2021-01-04 RX ADMIN — Medication 200 MILLIGRAM(S): at 05:27

## 2021-01-04 RX ADMIN — Medication 10 MILLILITER(S): at 00:43

## 2021-01-04 RX ADMIN — Medication 6 MILLIGRAM(S): at 05:28

## 2021-01-04 RX ADMIN — Medication 25 MILLIGRAM(S): at 17:50

## 2021-01-04 RX ADMIN — Medication 1 DROP(S): at 17:50

## 2021-01-04 RX ADMIN — ENOXAPARIN SODIUM 40 MILLIGRAM(S): 100 INJECTION SUBCUTANEOUS at 17:50

## 2021-01-04 RX ADMIN — Medication 200 MILLIGRAM(S): at 22:14

## 2021-01-04 RX ADMIN — BUDESONIDE AND FORMOTEROL FUMARATE DIHYDRATE 2 PUFF(S): 160; 4.5 AEROSOL RESPIRATORY (INHALATION) at 05:29

## 2021-01-04 RX ADMIN — PANTOPRAZOLE SODIUM 40 MILLIGRAM(S): 20 TABLET, DELAYED RELEASE ORAL at 05:28

## 2021-01-04 RX ADMIN — BUDESONIDE AND FORMOTEROL FUMARATE DIHYDRATE 2 PUFF(S): 160; 4.5 AEROSOL RESPIRATORY (INHALATION) at 17:51

## 2021-01-04 RX ADMIN — Medication 200 MILLIGRAM(S): at 12:33

## 2021-01-04 RX ADMIN — Medication 10 MILLILITER(S): at 05:28

## 2021-01-04 RX ADMIN — Medication 10 MILLILITER(S): at 12:33

## 2021-01-04 RX ADMIN — ENOXAPARIN SODIUM 40 MILLIGRAM(S): 100 INJECTION SUBCUTANEOUS at 05:28

## 2021-01-04 NOTE — DISCHARGE NOTE PROVIDER - NSDCFUADDINST_GEN_ALL_CORE_FT
You are being discharged home with supplemental oxygen to be used as needed and especially while active (walking, etc.) Follow up with your pulmonologist to plan on weaning off the oxygen when it is no longer needed.  Make appointment(s) to follow up with all of your physicians / healthcare providers. bring all discharge paperwork including discharge medication list to follow up appointment You are being discharged home with supplemental oxygen to be used as needed and especially while active (walking, etc.) Initially it is recommended you use 2 liters while "at rest" and increase to 3 liters while walking - for your comfort. Follow up with your pulmonologist to plan on weaning off the oxygen when it is no longer needed.  Make appointment(s) to follow up with all of your physicians / healthcare providers. bring all discharge paperwork including discharge medication list to follow up appointment

## 2021-01-04 NOTE — DISCHARGE NOTE PROVIDER - NSDCCPCAREPLAN_GEN_ALL_CORE_FT
PRINCIPAL DISCHARGE DIAGNOSIS  Diagnosis: Acute respiratory failure with hypoxia  Assessment and Plan of Treatment: You tested positive for COVID 19.  You no longer require hospitalization.  Please restrict activities outside of your home except for getting medical care.  Do not go to work, school, or public areas.  Avoid using public transportation, ride-sharing, or taxis.  Separate yourself from other people and animals in your home.  Call ahead before visiting your doctor.  Wear a facemask when you are around other people. Cover your cough and sneezes.  Clean your hands often.  Avoid sharing personal household items.  Clean all frequently touched surfaces daily.        SECONDARY DISCHARGE DIAGNOSES  Diagnosis: COVID-19  Assessment and Plan of Treatment:

## 2021-01-04 NOTE — PROGRESS NOTE ADULT - ASSESSMENT
60 F with COPD and asthma presents with COVID dx, cough, headache, nausea, and body aches x 3-4 days  Reported outpatient rapid COVID+  CXR with bilateral lower lobe ill defined opacities, I reviewed personally  +COVID PCR  Presently 3L NC  Overall,  1) COVID  COVID+  - S/p remdisivir course  - Dex 6mg po q 24 x 10 days then discontinue  - Supportive care, monitor for secondary infections, presently does not appear to have bacterial infection  2) Hypoxia  - Note baseline COPD, not normally on home O2  - O2 supplementation per primary team    Compa Alberto MD  Pager [phone]  After 5pm and on weekends call [phone]

## 2021-01-04 NOTE — DISCHARGE NOTE PROVIDER - NSDCMRMEDTOKEN_GEN_ALL_CORE_FT
Flonase 50 mcg/inh nasal spray: 2 spray(s) nasal once a day, As Needed  omeprazole 40 mg oral delayed release capsule: 1 cap(s) orally once a day, As Needed  ondansetron 8 mg oral tablet, disintegratin tab(s) orally 2 times a day, As Needed  Spiriva Respimat 60 ACT 2.5 mcg/inh inhalation aerosol: 2 puff(s) inhaled once a day  Symbicort 160 mcg-4.5 mcg/inh inhalation aerosol: 2 puff(s) inhaled 2 times a day   acetaminophen 325 mg oral tablet: 2 tab(s) orally every 6 hours, As needed, Temp greater or equal to 38.5C (101.3F), Mild Pain (1 - 3)  benzonatate 100 mg oral capsule: 2 cap(s) orally 3 times a day, As Needed for cough  dexamethasone 6 mg oral tablet: 1 tab(s) orally once a day start on 1/7/21 - for 2 more days only  Flonase 50 mcg/inh nasal spray: 2 spray(s) nasal once a day, As Needed  guaifenesin-dextromethorphan 100 mg-10 mg/5 mL oral liquid: 10 milliliter(s) orally 4 times a day, As Needed   omeprazole 40 mg oral delayed release capsule: 1 cap(s) orally once a day, As Needed  rivaroxaban 10 mg oral tablet: 1 tab(s) orally once a day   Symbicort 160 mcg-4.5 mcg/inh inhalation aerosol: 2 puff(s) inhaled 2 times a day

## 2021-01-04 NOTE — DISCHARGE NOTE PROVIDER - HOSPITAL COURSE
61 yo Female with PMH of COPD not on home O2, asthma dx COVID + OP 3 days prior, p/w cough,   increasing SOB, headache, nausea, abd pain, myalgia  x 9 -10 days.  Diagnosed with COVID-19. Pulmonary and ID consulted. CXR with bilateral lower lobe ill defined opacities.   S/p course of Symbicort and decadron.  Continue Robitussin DM/tessalon - off hycodan due to sedation. Observe off antibacterial antibiotics - procalcitonin level is not elevated     61 yo Female with PMH of COPD not on home O2, asthma dx COVID + OP 3 days prior, p/w cough,   increasing SOB, headache, nausea, abd pain, myalgia  x 9 -10 days.  Diagnosed with COVID-19. Pulmonary and ID consulted. CXR with bilateral lower lobe ill defined opacities.   S/p course of Remdesivir and decadron.  Continue Robitussin DM/tessalon - off hycodan due to sedation. off antibiotics - procalcitonin level was not elevated  Discharged home with supplemental oxygen requirement - 2 to 3 liters with activity.

## 2021-01-04 NOTE — DISCHARGE NOTE PROVIDER - CARE PROVIDER_API CALL
Andres Ariza  INTERNAL MEDICINE  [address] [place] [address]  Phone: [phone]  Fax: [phone]  Follow Up Time:    Andres Ariza  INTERNAL MEDICINE  [address] [place] [address]  Phone: [phone]  Fax: [phone]  Follow Up Time:     Olivier Camp  INTERNAL MEDICINE  [address] [place] [address], [address] [place] [address]  Phone: [phone]  Fax: [phone]  Follow Up Time:

## 2021-01-04 NOTE — PROGRESS NOTE ADULT - ASSESSMENT
60 f with    COVID 19 Pneumonia  - remdesivir 5/5  - steroids 6/10  - Proventil inhaler  - follow inflammatory markers  - ID follow  - Pulmonary follow   - AC  - supplement O2.    COPD  - nebs  - Pulmonary evaluation    Nausea control    DCP home if stable.    Andres Ariza MD pager [phone]

## 2021-01-04 NOTE — PROGRESS NOTE ADULT - ASSESSMENT
ASSESSMENT:    COVID-19 related pneumonia with asthma exacerbation and hypoxemia -> improved    PLAN/RECOMMENDATIONS:    oxygen supplementation to keep saturation greater than 92% - taper as tolerated  symbicort 160/4.5 mcg 2 puffs 2 times daily  remdesivir 200mg x 1 followed by 100mg IV x 4 days - follow renal function and LFTs -> completed  decadron 6mg IV/po x 10 days  diligent DVT prophylaxis - lovenox 40mg SQ 2 times daily  robitussin DM/tessalon - off hycodan due to sedation  follow inflammatory markers - ferritin - CRP - d-dimer  observe off antibacterial antibiotics - procalcitonin level is not elevated    Will follow with you. Plan of care discussed with the patient at bedside and Dr. To Camp MD, [facility]  [phone]  Pulmonary Medicine

## 2021-01-05 LAB
ALBUMIN SERPL ELPH-MCNC: 3.2 G/DL — LOW (ref 3.3–5)
ALP SERPL-CCNC: 57 U/L — SIGNIFICANT CHANGE UP (ref 40–120)
ALT FLD-CCNC: 45 U/L — SIGNIFICANT CHANGE UP (ref 10–45)
ANION GAP SERPL CALC-SCNC: 9 MMOL/L — SIGNIFICANT CHANGE UP (ref 5–17)
AST SERPL-CCNC: 46 U/L — HIGH (ref 10–40)
BILIRUB SERPL-MCNC: 0.4 MG/DL — SIGNIFICANT CHANGE UP (ref 0.2–1.2)
BUN SERPL-MCNC: 18 MG/DL — SIGNIFICANT CHANGE UP (ref 7–23)
CALCIUM SERPL-MCNC: 9 MG/DL — SIGNIFICANT CHANGE UP (ref 8.4–10.5)
CHLORIDE SERPL-SCNC: 102 MMOL/L — SIGNIFICANT CHANGE UP (ref 96–108)
CO2 SERPL-SCNC: 26 MMOL/L — SIGNIFICANT CHANGE UP (ref 22–31)
CREAT SERPL-MCNC: 0.64 MG/DL — SIGNIFICANT CHANGE UP (ref 0.5–1.3)
CRP SERPL-MCNC: 0.25 MG/DL — SIGNIFICANT CHANGE UP (ref 0–0.4)
D DIMER BLD IA.RAPID-MCNC: <150 NG/ML DDU — SIGNIFICANT CHANGE UP
FERRITIN SERPL-MCNC: 942 NG/ML — HIGH (ref 15–150)
GLUCOSE SERPL-MCNC: 169 MG/DL — HIGH (ref 70–99)
HCT VFR BLD CALC: 37.6 % — SIGNIFICANT CHANGE UP (ref 34.5–45)
HGB BLD-MCNC: 12.2 G/DL — SIGNIFICANT CHANGE UP (ref 11.5–15.5)
MCHC RBC-ENTMCNC: 29.2 PG — SIGNIFICANT CHANGE UP (ref 27–34)
MCHC RBC-ENTMCNC: 32.4 GM/DL — SIGNIFICANT CHANGE UP (ref 32–36)
MCV RBC AUTO: 90 FL — SIGNIFICANT CHANGE UP (ref 80–100)
NRBC # BLD: 0 /100 WBCS — SIGNIFICANT CHANGE UP (ref 0–0)
PLATELET # BLD AUTO: 297 K/UL — SIGNIFICANT CHANGE UP (ref 150–400)
POTASSIUM SERPL-MCNC: 4.4 MMOL/L — SIGNIFICANT CHANGE UP (ref 3.5–5.3)
POTASSIUM SERPL-SCNC: 4.4 MMOL/L — SIGNIFICANT CHANGE UP (ref 3.5–5.3)
PROT SERPL-MCNC: 6.6 G/DL — SIGNIFICANT CHANGE UP (ref 6–8.3)
RBC # BLD: 4.18 M/UL — SIGNIFICANT CHANGE UP (ref 3.8–5.2)
RBC # FLD: 13.2 % — SIGNIFICANT CHANGE UP (ref 10.3–14.5)
SODIUM SERPL-SCNC: 137 MMOL/L — SIGNIFICANT CHANGE UP (ref 135–145)
WBC # BLD: 7.12 K/UL — SIGNIFICANT CHANGE UP (ref 3.8–10.5)
WBC # FLD AUTO: 7.12 K/UL — SIGNIFICANT CHANGE UP (ref 3.8–10.5)

## 2021-01-05 PROCEDURE — 71045 X-RAY EXAM CHEST 1 VIEW: CPT | Mod: 26

## 2021-01-05 PROCEDURE — 99232 SBSQ HOSP IP/OBS MODERATE 35: CPT

## 2021-01-05 RX ADMIN — Medication 1 DROP(S): at 23:05

## 2021-01-05 RX ADMIN — Medication 10 MILLILITER(S): at 23:05

## 2021-01-05 RX ADMIN — Medication 200 MILLIGRAM(S): at 23:05

## 2021-01-05 RX ADMIN — Medication 1 DROP(S): at 05:31

## 2021-01-05 RX ADMIN — BUDESONIDE AND FORMOTEROL FUMARATE DIHYDRATE 2 PUFF(S): 160; 4.5 AEROSOL RESPIRATORY (INHALATION) at 05:33

## 2021-01-05 RX ADMIN — Medication 650 MILLIGRAM(S): at 17:21

## 2021-01-05 RX ADMIN — Medication 650 MILLIGRAM(S): at 11:51

## 2021-01-05 RX ADMIN — PANTOPRAZOLE SODIUM 40 MILLIGRAM(S): 20 TABLET, DELAYED RELEASE ORAL at 05:32

## 2021-01-05 RX ADMIN — Medication 10 MILLILITER(S): at 05:31

## 2021-01-05 RX ADMIN — Medication 200 MILLIGRAM(S): at 14:13

## 2021-01-05 RX ADMIN — Medication 1 DROP(S): at 11:06

## 2021-01-05 RX ADMIN — Medication 10 MILLILITER(S): at 00:00

## 2021-01-05 RX ADMIN — Medication 1 DROP(S): at 17:19

## 2021-01-05 RX ADMIN — Medication 10 MILLILITER(S): at 17:19

## 2021-01-05 RX ADMIN — Medication 1 DROP(S): at 00:00

## 2021-01-05 RX ADMIN — Medication 650 MILLIGRAM(S): at 11:08

## 2021-01-05 RX ADMIN — Medication 650 MILLIGRAM(S): at 18:10

## 2021-01-05 RX ADMIN — Medication 25 MILLIGRAM(S): at 05:48

## 2021-01-05 RX ADMIN — Medication 25 MILLIGRAM(S): at 23:12

## 2021-01-05 RX ADMIN — Medication 200 MILLIGRAM(S): at 05:32

## 2021-01-05 RX ADMIN — ENOXAPARIN SODIUM 40 MILLIGRAM(S): 100 INJECTION SUBCUTANEOUS at 05:31

## 2021-01-05 RX ADMIN — BUDESONIDE AND FORMOTEROL FUMARATE DIHYDRATE 2 PUFF(S): 160; 4.5 AEROSOL RESPIRATORY (INHALATION) at 17:19

## 2021-01-05 RX ADMIN — Medication 10 MILLILITER(S): at 11:06

## 2021-01-05 RX ADMIN — ENOXAPARIN SODIUM 40 MILLIGRAM(S): 100 INJECTION SUBCUTANEOUS at 17:19

## 2021-01-05 RX ADMIN — Medication 6 MILLIGRAM(S): at 05:32

## 2021-01-05 NOTE — DIETITIAN INITIAL EVALUATION ADULT. - ORAL NUTRITION SUPPLEMENTS
RD to continue to provide Mighty Shake 3x/day (200 kcal, 6 gm protein in each) and high-protein apple juice to optimize intake

## 2021-01-05 NOTE — DIETITIAN INITIAL EVALUATION ADULT. - OTHER INFO
Unable to conduct a face-to-face interview at this time due to limited contact restrictions related to pt's medical condition and isolation precautions. Subjective information obtained from ( ). Unable to conduct a face-to-face interview at this time due to limited contact restrictions related to pt's medical condition and isolation precautions. Unable to reach pt or spouse via phone x multiple attempts. Subjective information obtained from comprehensive chart review and RN at this time.     RN reports pt eating well with no issues. No acute GI distress reported. Last BM documented 12/31, however RN reports pt may have had 1 today (pt sleeping at time of interview, unable to report). No difficulties chewing or swallowing reported. NKFA per chart.

## 2021-01-05 NOTE — PROGRESS NOTE ADULT - ASSESSMENT
60 F with COPD and asthma presents with COVID dx, cough, headache, nausea, and body aches x 3-4 days  Reported outpatient rapid COVID+  CXR with bilateral lower lobe ill defined opacities, I reviewed personally  +COVID PCR  Presently 3L NC  Overall,  1) COVID  COVID+  - S/p remdisivir course  - Dex 6mg po q 24 x 10 days then discontinue  - Supportive care, monitor for secondary infections, presently does not appear to have bacterial infection  2) Hypoxia  - Note baseline COPD, not normally on home O2  - O2 supplementation per primary team    Compa Alberto MD  Pager [phone]  After 5pm and on weekends call [phone]

## 2021-01-05 NOTE — PROGRESS NOTE ADULT - ASSESSMENT
ASSESSMENT:    COVID-19 related pneumonia with asthma exacerbation and hypoxemia -> improved    PLAN/RECOMMENDATIONS:    oxygen supplementation to keep saturation greater than 92% - at present, the patient needs supplemental oxygen for discharge - 2lpm via nasal canula at rest -> 3lpm via nasal canula with exertion  follow-up CXR  symbicort 160/4.5 mcg 2 puffs 2 times daily  remdesivir 200mg x 1 followed by 100mg IV x 4 days - follow renal function and LFTs -> completed  decadron 6mg IV/po x 10 days - can complete the 10 day course of steroids with decadron 6mg po daily  diligent DVT prophylaxis - lovenox 40mg SQ 2 times daily - would discharge on Xarelto 10mg daily  robitussin DM/tessalon - off hycodan due to sedation  follow inflammatory markers - ferritin - CRP - d-dimer  observe off antibacterial antibiotics - procalcitonin level is not elevated    Will follow with you. Plan of care discussed with the patient at bedside, the dedicated floor NP and Dr. To Camp MD, [facility]  [phone]  Pulmonary Medicine     ASSESSMENT:    COVID-19 related pneumonia with asthma exacerbation and hypoxemia -> improved    PLAN/RECOMMENDATIONS:    oxygen supplementation to keep saturation greater than 92% - at present, the patient needs supplemental oxygen for discharge - 2lpm via nasal canula at rest -> 3lpm via nasal canula with exertion  follow-up CXR  symbicort 160/4.5 mcg 2 puffs 2 times daily  remdesivir 200mg x 1 followed by 100mg IV x 4 days - follow renal function and LFTs -> completed  decadron 6mg IV/po x 10 days - can complete the 10 day course of steroids with decadron 6mg po daily  diligent DVT prophylaxis - lovenox 40mg SQ 2 times daily - would discharge on Xarelto 10mg daily  robitussin DM/tessalon - off hycodan due to sedation  following inflammatory markers - ferritin - CRP - d-dimer  observe off antibacterial antibiotics - procalcitonin level is not elevated    Will follow with you. Plan of care discussed with the patient at bedside, the dedicated floor NP and Dr. Ariza. No pulmonary objection to discharge with supplemental oxygen as long as "cleared" by physical therapy.     Olivier Camp MD, [facility]  [phone]  Pulmonary Medicine

## 2021-01-05 NOTE — PROGRESS NOTE ADULT - ASSESSMENT
60 f with    COVID 19 Pneumonia  - remdesivir 5/5  - steroids 7/10  - Proventil inhaler  - follow inflammatory markers  - ID follow  - Pulmonary follow   - AC with Lovenox  - supplement O2.    COPD  - nebs  - Pulmonary evaluation    Nausea control    DCP home in progress if stable.    Andres Ariza MD pager [phone]

## 2021-01-05 NOTE — DIETITIAN INITIAL EVALUATION ADULT. - REASON INDICATOR FOR ASSESSMENT
Pt seen for length of stay assessment on COVID unit. Source: EMR, ( ). Pt is a 59 yo female with PMH of COPD and asthma who presented with COVID, cough, headache, nausea, and body aches x 3-4 days, admitted 12/30.    Unable to conduct a face-to-face interview at this time due to limited contact restrictions related to pt's medical condition and isolation precautions. Pt seen for length of stay assessment on COVID unit. Source: EMR, RN. Pt is a 59 yo female with PMH of COPD and asthma who presented with COVID, cough, headache, nausea, and body aches x 3-4 days, admitted 12/30.    Unable to conduct a face-to-face interview at this time due to limited contact restrictions related to pt's medical condition and isolation precautions.

## 2021-01-06 VITALS
TEMPERATURE: 98 F | RESPIRATION RATE: 18 BRPM | HEART RATE: 67 BPM | OXYGEN SATURATION: 95 % | SYSTOLIC BLOOD PRESSURE: 125 MMHG | DIASTOLIC BLOOD PRESSURE: 76 MMHG

## 2021-01-06 LAB
ANION GAP SERPL CALC-SCNC: 13 MMOL/L — SIGNIFICANT CHANGE UP (ref 5–17)
BUN SERPL-MCNC: 20 MG/DL — SIGNIFICANT CHANGE UP (ref 7–23)
CALCIUM SERPL-MCNC: 9.3 MG/DL — SIGNIFICANT CHANGE UP (ref 8.4–10.5)
CHLORIDE SERPL-SCNC: 101 MMOL/L — SIGNIFICANT CHANGE UP (ref 96–108)
CO2 SERPL-SCNC: 24 MMOL/L — SIGNIFICANT CHANGE UP (ref 22–31)
CREAT SERPL-MCNC: 0.61 MG/DL — SIGNIFICANT CHANGE UP (ref 0.5–1.3)
GLUCOSE SERPL-MCNC: 179 MG/DL — HIGH (ref 70–99)
HCT VFR BLD CALC: 38.6 % — SIGNIFICANT CHANGE UP (ref 34.5–45)
HGB BLD-MCNC: 12.7 G/DL — SIGNIFICANT CHANGE UP (ref 11.5–15.5)
MCHC RBC-ENTMCNC: 29.5 PG — SIGNIFICANT CHANGE UP (ref 27–34)
MCHC RBC-ENTMCNC: 32.9 GM/DL — SIGNIFICANT CHANGE UP (ref 32–36)
MCV RBC AUTO: 89.8 FL — SIGNIFICANT CHANGE UP (ref 80–100)
NRBC # BLD: 0 /100 WBCS — SIGNIFICANT CHANGE UP (ref 0–0)
PLATELET # BLD AUTO: 368 K/UL — SIGNIFICANT CHANGE UP (ref 150–400)
POTASSIUM SERPL-MCNC: 4.3 MMOL/L — SIGNIFICANT CHANGE UP (ref 3.5–5.3)
POTASSIUM SERPL-SCNC: 4.3 MMOL/L — SIGNIFICANT CHANGE UP (ref 3.5–5.3)
RBC # BLD: 4.3 M/UL — SIGNIFICANT CHANGE UP (ref 3.8–5.2)
RBC # FLD: 13.2 % — SIGNIFICANT CHANGE UP (ref 10.3–14.5)
SODIUM SERPL-SCNC: 138 MMOL/L — SIGNIFICANT CHANGE UP (ref 135–145)
WBC # BLD: 12.34 K/UL — HIGH (ref 3.8–10.5)
WBC # FLD AUTO: 12.34 K/UL — HIGH (ref 3.8–10.5)

## 2021-01-06 PROCEDURE — 84145 PROCALCITONIN (PCT): CPT

## 2021-01-06 PROCEDURE — 80048 BASIC METABOLIC PNL TOTAL CA: CPT

## 2021-01-06 PROCEDURE — 85379 FIBRIN DEGRADATION QUANT: CPT

## 2021-01-06 PROCEDURE — 94640 AIRWAY INHALATION TREATMENT: CPT

## 2021-01-06 PROCEDURE — 99285 EMERGENCY DEPT VISIT HI MDM: CPT | Mod: 25

## 2021-01-06 PROCEDURE — 86769 SARS-COV-2 COVID-19 ANTIBODY: CPT

## 2021-01-06 PROCEDURE — 96375 TX/PRO/DX INJ NEW DRUG ADDON: CPT

## 2021-01-06 PROCEDURE — 85025 COMPLETE CBC W/AUTO DIFF WBC: CPT

## 2021-01-06 PROCEDURE — 97530 THERAPEUTIC ACTIVITIES: CPT

## 2021-01-06 PROCEDURE — 80053 COMPREHEN METABOLIC PANEL: CPT

## 2021-01-06 PROCEDURE — 83690 ASSAY OF LIPASE: CPT

## 2021-01-06 PROCEDURE — 82565 ASSAY OF CREATININE: CPT

## 2021-01-06 PROCEDURE — 86140 C-REACTIVE PROTEIN: CPT

## 2021-01-06 PROCEDURE — 71045 X-RAY EXAM CHEST 1 VIEW: CPT

## 2021-01-06 PROCEDURE — 96374 THER/PROPH/DIAG INJ IV PUSH: CPT

## 2021-01-06 PROCEDURE — 99232 SBSQ HOSP IP/OBS MODERATE 35: CPT

## 2021-01-06 PROCEDURE — 85027 COMPLETE CBC AUTOMATED: CPT

## 2021-01-06 PROCEDURE — 97116 GAIT TRAINING THERAPY: CPT

## 2021-01-06 PROCEDURE — 82728 ASSAY OF FERRITIN: CPT

## 2021-01-06 PROCEDURE — 86803 HEPATITIS C AB TEST: CPT

## 2021-01-06 PROCEDURE — 82248 BILIRUBIN DIRECT: CPT

## 2021-01-06 PROCEDURE — 87635 SARS-COV-2 COVID-19 AMP PRB: CPT

## 2021-01-06 PROCEDURE — 93005 ELECTROCARDIOGRAM TRACING: CPT

## 2021-01-06 PROCEDURE — 80076 HEPATIC FUNCTION PANEL: CPT

## 2021-01-06 RX ORDER — RIVAROXABAN 15 MG-20MG
1 KIT ORAL
Qty: 30 | Refills: 0
Start: 2021-01-06 | End: 2021-02-04

## 2021-01-06 RX ORDER — DEXAMETHASONE 0.5 MG/5ML
1 ELIXIR ORAL
Qty: 2 | Refills: 0
Start: 2021-01-06 | End: 2021-01-07

## 2021-01-06 RX ORDER — ACETAMINOPHEN 500 MG
2 TABLET ORAL
Qty: 0 | Refills: 0 | DISCHARGE
Start: 2021-01-06

## 2021-01-06 RX ORDER — GUAIFENESIN/DEXTROMETHORPHAN 600MG-30MG
10 TABLET, EXTENDED RELEASE 12 HR ORAL
Qty: 560 | Refills: 0
Start: 2021-01-06 | End: 2021-01-19

## 2021-01-06 RX ADMIN — Medication 6 MILLIGRAM(S): at 05:52

## 2021-01-06 RX ADMIN — PANTOPRAZOLE SODIUM 40 MILLIGRAM(S): 20 TABLET, DELAYED RELEASE ORAL at 05:52

## 2021-01-06 RX ADMIN — Medication 200 MILLIGRAM(S): at 12:18

## 2021-01-06 RX ADMIN — Medication 200 MILLIGRAM(S): at 05:52

## 2021-01-06 RX ADMIN — BUDESONIDE AND FORMOTEROL FUMARATE DIHYDRATE 2 PUFF(S): 160; 4.5 AEROSOL RESPIRATORY (INHALATION) at 05:53

## 2021-01-06 RX ADMIN — Medication 10 MILLILITER(S): at 05:52

## 2021-01-06 RX ADMIN — Medication 10 MILLILITER(S): at 12:18

## 2021-01-06 RX ADMIN — Medication 1 DROP(S): at 12:19

## 2021-01-06 RX ADMIN — ENOXAPARIN SODIUM 40 MILLIGRAM(S): 100 INJECTION SUBCUTANEOUS at 05:52

## 2021-01-06 RX ADMIN — Medication 1 DROP(S): at 05:52

## 2021-01-06 NOTE — PHYSICAL THERAPY INITIAL EVALUATION ADULT - IMPAIRMENTS CONTRIBUTING TO GAIT DEVIATIONS, PT EVAL
decreased endurance , balance fair ; see gait comments above ; spouse home to assist per pt/decreased strength

## 2021-01-06 NOTE — DISCHARGE NOTE NURSING/CASE MANAGEMENT/SOCIAL WORK - PATIENT PORTAL LINK FT
You can access the FollowMyHealth Patient Portal offered by [facility] by registering at the following website: [email or web address]. By joining [employer]’s FollowMyHealth portal, you will also be able to view your health information using other applications (apps) compatible with our system.

## 2021-01-06 NOTE — PHYSICAL THERAPY INITIAL EVALUATION ADULT - GENERAL OBSERVATIONS, REHAB EVAL
pt received in bed nad except fatigue easily per pt ; pt on 2 L nc o2 (PT spoke with Aleida FRASER prior to session can test off nc o2 and monitor pulse ox ) ; pt was off nc O2 x 10 min pulse ox RA drop to 87 % in bed with rest and rom assessment ; pt placed back on 2 L nc O2 92-93 % on 2 L at rest nc ; pt walked to BR and back 30 ft x 2 supervision slow but steady on 2 L nc o2 pulse ox 90 % ;pt up to 3 l nc o2 when amb 15 ft then up and down a step stool w/ HR to mimic steps 12 times pulse ox 92 % on 3 L nc ; pt rest on bed x 5 min o2 lowered to 2 L nc o2 at rest 93%

## 2021-01-06 NOTE — CHART NOTE - NSCHARTNOTEFT_GEN_A_CORE
Seen and evaluated by physical therapy this AM.  PT documents: "ventilation and respiration/gas exchange; muscle strength; drop at RA at rest and with gentle ROM assess to 87% had to be placed back on 2 L nc o2 92-93 % , walk to BR with supervision 30 ft x 2 90 % 2 L nc o2 ; up to 3 L nc o2 walked again 15 ft x 2 and up/down a step stool w/ HR x 15 times pulse ox 92 % on 3 L"    Plan to discharge home with home / portable oxygen and home physical therapy as well as assistance as needed from family.
[facility] Chart Note    Patient presents with a diagnosis of COVID-19. Patient will require  1.5 liters of continuous oxygen at home. Room air at rest oxygen saturation is 86%.   Oxygen saturation at rest on 1.5 L NC is 92%.    Angelia Burgos PA-C  Dept of Medicine  #[id number]

## 2021-01-06 NOTE — PROGRESS NOTE ADULT - ASSESSMENT
60 F with COPD and asthma presents with COVID dx, cough, headache, nausea, and body aches x 3-4 days  Reported outpatient rapid COVID+  CXR with bilateral lower lobe ill defined opacities, I reviewed personally  +COVID PCR  Presently 3L NC  Overall,  1) COVID  - COVID+  - S/p remdisivir course  - Dex 6mg po q 24 x 10 days then discontinue  - Supportive care, monitor for secondary infections, presently does not appear to have bacterial infection  2) Hypoxia  - Note baseline COPD, not normally on home O2  - O2 supplementation per primary team    Compa Alberto MD  Pager [phone]  After 5pm and on weekends call [phone]

## 2021-01-06 NOTE — PHYSICAL THERAPY INITIAL EVALUATION ADULT - IMPAIRMENTS FOUND, PT EVAL
drop at RA at rest and with gentle ROM assess to 87% had to be placed back on 2 L nc o2 92-93 % , walk to BR with supervision 30 ft x 2 90 % 2 L nc o2 ; up to 3 L nc o2 walked again 15 ft x 2 and up/down a step stool w/ HR x 15 times pulse ox 92 % on 3 L/aerobic capacity/endurance/gait, locomotion, and balance/muscle strength/ventilation and respiration/gas exchange

## 2021-01-06 NOTE — PROGRESS NOTE ADULT - PROVIDER SPECIALTY LIST ADULT
Infectious Disease
Internal Medicine
Internal Medicine
Pulmonology
Infectious Disease
Pulmonology
Pulmonology
Internal Medicine
Internal Medicine
Pulmonology
Internal Medicine

## 2021-01-06 NOTE — DISCHARGE NOTE NURSING/CASE MANAGEMENT/SOCIAL WORK - NSSCTYPOFSERV_GEN_ALL_CORE
RN to assess for skilled needs & P/T, to begin the day after discharge. Please discuss if co-pay is required with agency.

## 2021-01-06 NOTE — PHYSICAL THERAPY INITIAL EVALUATION ADULT - GAIT DISTANCE, PT EVAL
30 ft x2 on 2 L nc o2 pulse ox 90 % (per pulmonary note wish to have > 90 % pt up to 3 L nc O2 then 15 ft x 2 and able to maintain 92 % on 3 L nc o2 ;pt understood to have supervision when up amb

## 2021-01-06 NOTE — PROGRESS NOTE ADULT - ASSESSMENT
60 f with    COVID 19 Pneumonia  - remdesivir 5/5  - steroids 8/10  - Proventil inhaler  - follow inflammatory markers  - ID follow  - Pulmonary follow Cleared for DC  - AC with Lovenox. Change to Xarelto 10 mg daily for 30 days.  - supplement O2.    COPD  - nebs  - Pulmonary evaluation    Nausea control    DC home with home O2. Follow with PMD/ Pulmonary in 1-2 weeks. QA.    Andres Ariza MD pager [phone]

## 2021-01-06 NOTE — PROGRESS NOTE ADULT - ASSESSMENT
ASSESSMENT:    COVID-19 related pneumonia with asthma exacerbation and hypoxemia -> improved    PLAN/RECOMMENDATIONS:    oxygen supplementation to keep saturation greater than 92% - at present, the patient needs supplemental oxygen for discharge - 2lpm via nasal canula at rest -> 3lpm via nasal canula with exertion  follow-up CXR with improving mid to lower lobe infiltrates  symbicort 160/4.5 mcg 2 puffs 2 times daily  remdesivir 200mg x 1 followed by 100mg IV x 4 days - follow renal function and LFTs -> completed  decadron 6mg IV/po x 10 days - can complete the 10 day course of steroids with decadron 6mg po daily  diligent DVT prophylaxis - lovenox 40mg SQ 2 times daily - would discharge on Xarelto 10mg daily for 1 month  robitussin DM/tessalon - off hycodan due to sedation  following inflammatory markers - ferritin - CRP - d-dimer  observe off antibacterial antibiotics - procalcitonin level is not elevated    Will follow with you. Plan of care discussed with the patient at bedside, the dedicated floor NP and Dr. Ariza. No pulmonary objection to discharge with supplemental oxygen as long as "cleared" by physical therapy.     Olivier Camp MD, [facility]  [phone]  Pulmonary Medicine

## 2021-01-06 NOTE — PHYSICAL THERAPY INITIAL EVALUATION ADULT - DISCHARGE DISPOSITION, PT EVAL
pt clear for discharge need Home O2 with assist of family ;Home with assist as needed spouse can assist ; Home PT to ioncrease fxl mobility , gait distance , endurance , strength, balance , fall prevention education continued ; pt educated re splinting technique with pillow or folded blanket when cough and demo/home w/ assist/home w/ home PT

## 2021-01-06 NOTE — PHYSICAL THERAPY INITIAL EVALUATION ADULT - LEVEL OF INDEPENDENCE: SIT/SUPINE, REHAB EVAL
PAST MEDICAL HISTORY:  Atrial fibrillation     History of pneumonia     Hyperlipidemia     Hypertension     Lung cancer s/p R. pneumonectomy    Seizure disorder
independent

## 2021-01-06 NOTE — PHYSICAL THERAPY INITIAL EVALUATION ADULT - PLANNED THERAPY INTERVENTIONS, PT EVAL
stair train goal; pt will negotiate 2 flights of steps with HR 1-2  week independent/balance training/gait training/strengthening

## 2021-01-06 NOTE — PHYSICAL THERAPY INITIAL EVALUATION ADULT - GAIT DEVIATIONS NOTED, PT EVAL
decreased gerber/increased time in double stance/decreased step length/decreased stride length/decreased weight-shifting ability

## 2021-01-06 NOTE — PHYSICAL THERAPY INITIAL EVALUATION ADULT - ADDITIONAL COMMENTS
pt lives in apt in a house with spouse and 3 children ;pt has 3 steps to enter with HR then 15 steps to 2 nd level of home ;1 child has tested positive for COVID ; spouse Chuck Judd is ID as emergency Contact [phone]; no Home O2 pt lives in apt in a house with spouse and 2 children youngest are 4 and 6  ;pt has 3 steps to enter with HR then full flight of steps with HR to duplex living rm / dining rm /kitchen then 7 steps to bedrm / BR level  ;2 child have tested positive for COVID spouse negative so far per pt  ; spouse Chuck Judd is ID as emergency Contact [phone]; no Home O2; spoke with pt at length re fears of going home on nc O2 (pt opened up re pt mother who passed 3 yrs ago had been placed on O2 and declined once that happened , condolences and emotional support , PT explained the benefits of nc O2 right now while recuperating from COVID and pt understood and agreeable to now that session has been completed

## 2021-01-06 NOTE — PHYSICAL THERAPY INITIAL EVALUATION ADULT - PRECAUTIONS/LIMITATIONS, REHAB EVAL
60F with COPD and asthma presents with COVID dx, cough, headache, nausea, and body aches x 3-4 days.  Pt reports worsening SOB. Does not use home O2 for COPD. Also reports diffuse lower abdominal pain. Denies fevers, CP, vomiting, diarrhea. Patient states starting ~1 week before [place] developed mild symptoms of fatigue, headache, body aches, abd pain. These symptoms progressed into [place] to the point where she was so fatigued she couldn't get out of bed. Had a rapid test that was positive for COVID so was sent to the hospital for further care. patient on [place] O2, feels SOB worse on ambulation. Feels nausea. Generally felt unwell 60F with COPD and asthma presents with COVID dx, cough, headache, nausea, and body aches x 3-4 days.  Pt reports worsening SOB. Does not use home O2 for COPD. Also reports diffuse lower abdominal pain. Denies fevers, CP, vomiting, diarrhea. Patient states starting ~1 week before [place] developed mild symptoms of fatigue, headache, body aches, abd pain. These symptoms progressed into [place] to the point where she was so fatigued she couldn't get out of bed. Had a rapid test that was positive for COVID so was sent to the hospital for further care. patient on [place] O2, feels SOB worse on ambulation. Feels nausea. Generally felt unwell/fall precautions/isolation precautions

## 2022-01-08 NOTE — DISCHARGE NOTE NURSING/CASE MANAGEMENT/SOCIAL WORK - FLU SEASON?
Problem: Patient Education: Go to Patient Education Activity  Goal: Patient/Family Education  Outcome: Progressing Towards Goal     Problem: Falls - Risk of  Goal: *Absence of Falls  Description: Document Hermanmeka Blanton Fall Risk and appropriate interventions in the flowsheet. Outcome: Progressing Towards Goal  Note: Fall Risk Interventions:                 Elimination Interventions: Bed/chair exit alarm              Problem: Pressure Injury - Risk of  Goal: *Prevention of pressure injury  Description: Document John Scale and appropriate interventions in the flowsheet.   Outcome: Progressing Towards Goal  Note: Pressure Injury Interventions:  Sensory Interventions: Assess changes in LOC    Moisture Interventions: Absorbent underpads    Activity Interventions: Assess need for specialty bed    Mobility Interventions: Pressure redistribution bed/mattress (bed type)    Nutrition Interventions: Discuss nutritional consult with provider,Document food/fluid/supplement intake    Friction and Shear Interventions: Apply protective barrier, creams and emollients Yes...

## 2023-07-21 NOTE — DIETITIAN INITIAL EVALUATION ADULT. - WEIGHT (LBS)
132 Clofazimine Pregnancy And Lactation Text: This medication is Pregnancy Category C and isn't considered safe during pregnancy. It is excreted in breast milk.

## 2024-02-10 RX ORDER — ONDANSETRON 8 MG/1
1 TABLET, FILM COATED ORAL
Qty: 0 | Refills: 0 | DISCHARGE

## 2024-02-10 RX ORDER — BUDESONIDE AND FORMOTEROL FUMARATE DIHYDRATE 160; 4.5 UG/1; UG/1
2 AEROSOL RESPIRATORY (INHALATION)
Qty: 0 | Refills: 0 | DISCHARGE

## 2024-02-10 RX ORDER — FLUTICASONE PROPIONATE 50 MCG
2 SPRAY, SUSPENSION NASAL
Qty: 0 | Refills: 0 | DISCHARGE

## 2024-02-10 RX ORDER — TIOTROPIUM BROMIDE 18 UG/1
2 CAPSULE ORAL; RESPIRATORY (INHALATION)
Qty: 0 | Refills: 0 | DISCHARGE

## 2024-02-10 RX ORDER — OMEPRAZOLE 10 MG/1
1 CAPSULE, DELAYED RELEASE ORAL
Qty: 0 | Refills: 0 | DISCHARGE

## 2024-02-28 NOTE — ED PROVIDER NOTE - RESPIRATORY [-], MLM
H&P completed on 24 has been reviewed, the patient has been examined and:  I concur with the findings and no changes have occurred since H&P was written.    Plan Laparoscopic Total hysterectomy, bilateral salpingectomy, cystoscopy, possible oophorectomy. Patient desires preservation of ovary(ies) if possible but concerned about pain related to adhesions especially on the right.             Subjective:      Patient ID: Waleska Foley is a 48 y.o. female.     Chief Complaint:  Pre-op Exam        History of Present Illness  49 y/o  with a history of c/s X 2 and laparoscopy X 2 for lysis of adhesions with return of lower abdominal/pelvic, hip and low back pain over the last 4-5 months.  She underwent laparoscopic lysis of a thick adhesion between the uterine fundus and the anterior abdominal wall in 2021 and did well until last September or October.  The pain is constant with intermittent exacerbation when it becomes severe.  She notes it worsens with physical activity such as exercise, standing, intercourse, etc.  She saw a therapist who employed various modalities such as dry needling, laser, etc which only provided short term relief.  since return of pain in early February, the symptoms have intensified  and her activity is limited.  The pelvic pain and pressure are exacerbated in the upright position and better when supine. Pain is greater on the right than the left, but is bilateral.     Pelvic ultrasound:     The uterus measures 6.3 x 3.1 x 3.8 cm.  There appears to be varices within the myometrium uterus.  These were measured as cyst however there does appear to be flow demonstrated within a single image within these.     The right ovary measures 3.9 x 2.8 x 3.1 cm.  There is flow present.     The left ovary measures 3 x 2.3 x 2.4 cm.  There is a cyst present measuring 2.2 x 2 x 2.1 cm.  This is mildly increased in size from the previous study.  There are varices seen in either adnexa.      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     Left ovarian cyst.     Questionable cyst versus varices within the myometrium.        Electronically signed by: Carmine Escamilla MD  Date:                                            2024  Time:                                           09:53     She was treated with progesterone for pelvic congestion and she has had no improvement in symptoms. She desires to proceed with definitive therapy in the form of total laparoscopic hysterectomy, bilateral salpingectomy, cystoscopy.           GYN & OB History  No LMP recorded. Patient has had an ablation.   Date of Last Pap: 2022                      OB History    Para Term  AB Living   5 2     2 2   SAB IAB Ectopic Multiple Live Births      2       2          # Outcome Date GA Lbr Dain/2nd Weight Sex Delivery Anes PTL Lv   5 Para                     4            CS-Unspec     KENAN   3 SAB                     2 SAB                     1 Para           CS-Unspec     KENAN         Review of Systems  Review of Systems   Constitutional:  Positive for activity change and fatigue. Negative for appetite change, chills and fever.   Respiratory:  Negative for cough.    Cardiovascular:  Negative for chest pain and palpitations.   Gastrointestinal:  Positive for abdominal pain. Negative for bloating, blood in stool, constipation, diarrhea and nausea.   Genitourinary:  Positive for bladder incontinence, dyspareunia, frequency and pelvic pain. Negative for dysuria, flank pain and hot flashes.   Musculoskeletal:  Negative for arthralgias and joint swelling.   Integumentary:  Negative for rash.            Objective:      Physical Exam:   Constitutional: She is oriented to person, place, and time. She appears well-developed and well-nourished. She appears distressed.    HENT:   Head: Normocephalic and atraumatic.    Eyes: Pupils are equal, round, and reactive to light.    Neck: No thyromegaly present.    Cardiovascular:   Normal rate, regular rhythm and normal heart sounds.             Pulmonary/Chest: Effort normal and breath sounds normal.         Abdominal: Soft. She exhibits no mass. There is abdominal tenderness. There is no rebound and no guarding. No hernia. Hernia confirmed negative in the right inguinal area and confirmed negative in the left inguinal area.     Genitourinary:    Vagina, right adnexa and left adnexa normal.      Pelvic exam was performed with patient in the lithotomy position.   The external female genitalia was normal.     Labial bartholins normal.There is no tenderness or lesion on the right labia. There is tenderness on the left labia. There is no lesion on the left labia. Cervix is normal. Right adnexum displays no tenderness and no fullness. Left adnexum displays no tenderness and no fullness. Vagina exhibits no lesion. No vaginal discharge, bleeding, rectocele or cystocele in the vagina. Uerus contour normal  Uterus is tender. Uterus is not hosting fibroids and no uterine prolapse. Normal urethral meatus.          Musculoskeletal: Normal range of motion.       Neurological: She is alert and oriented to person, place, and time.    Skin: Skin is warm and dry.    Psychiatric: Judgment and thought content normal.         Assessment:      1. Pelvic pain    2. Pelvic congestion    3. Dyspareunia in female          Plan:      Total Laparoscopic Hysterectomy, bilateral salpingectomy, cystoscopy, possible oophorectomy, possible conversion to vaginal or abdominal approach.   Questions encouraged and answered.  She desires ovarian preservation in the case of normal appearing, nonadherent ovaries      Electronically signed by Rigo Santos MD at 2/23/2024  8:52 AM   no hemoptysis

## 2024-09-23 NOTE — ED ADULT NURSE NOTE - NSFALLRSKASSESASSIST_ED_ALL_ED
Patient describes months of fine intention tremor, both arms, can feel it inside of her.  Not tried.  Rest, not noted by other people.  She is pretty sure it is due to one of her medications, has been concerned about her asthma medication causing the problem, also questioning metformin today.    Exam is negative for tremor.    Discussed that if there is an offending medication, lithium is probably most likely.  Would certainly be concerned about Abilify should this be a rest tremor but it does not appear to be parkinsonian.  Referral to psychiatry to discuss bipolar treatment.   no

## 2025-03-24 NOTE — ED PROVIDER NOTE - PMH
The patient's sister Kike called into the office.  The patient has 1 more pen of Zepbound 5 mg.  She wants to know if she can go up to the next does.  She is doing well on the medication.  Her follow up appointment with you is in May.  Please advise.   COPD (chronic obstructive pulmonary disease)